# Patient Record
Sex: MALE | Race: OTHER | HISPANIC OR LATINO | Employment: FULL TIME | URBAN - METROPOLITAN AREA
[De-identification: names, ages, dates, MRNs, and addresses within clinical notes are randomized per-mention and may not be internally consistent; named-entity substitution may affect disease eponyms.]

---

## 2023-04-20 ENCOUNTER — APPOINTMENT (OUTPATIENT)
Dept: URGENT CARE | Facility: MEDICAL CENTER | Age: 28
End: 2023-04-20

## 2023-06-01 ENCOUNTER — HOSPITAL ENCOUNTER (EMERGENCY)
Facility: HOSPITAL | Age: 28
Discharge: HOME | End: 2023-06-01
Attending: EMERGENCY MEDICINE

## 2023-06-01 VITALS
DIASTOLIC BLOOD PRESSURE: 103 MMHG | HEART RATE: 73 BPM | TEMPERATURE: 97.8 F | OXYGEN SATURATION: 100 % | SYSTOLIC BLOOD PRESSURE: 171 MMHG | RESPIRATION RATE: 18 BRPM

## 2023-06-01 DIAGNOSIS — K08.89 PAIN, DENTAL: Primary | ICD-10-CM

## 2023-06-01 PROCEDURE — 96372 THER/PROPH/DIAG INJ SC/IM: CPT

## 2023-06-01 PROCEDURE — 99282 EMERGENCY DEPT VISIT SF MDM: CPT

## 2023-06-01 RX ORDER — OXYCODONE HYDROCHLORIDE 5 MG/1
5 TABLET ORAL ONCE
Status: COMPLETED | OUTPATIENT
Start: 2023-06-01 | End: 2023-06-01

## 2023-06-01 RX ORDER — KETOROLAC TROMETHAMINE 30 MG/ML
15 INJECTION, SOLUTION INTRAMUSCULAR; INTRAVENOUS ONCE
Status: COMPLETED | OUTPATIENT
Start: 2023-06-01 | End: 2023-06-01

## 2023-06-01 RX ORDER — PENICILLIN V POTASSIUM 500 MG/1
500 TABLET ORAL 4 TIMES DAILY
Qty: 28 TABLET | Refills: 0 | Status: SHIPPED | OUTPATIENT
Start: 2023-06-01 | End: 2023-06-08

## 2023-06-01 RX ORDER — PENICILLIN V POTASSIUM 250 MG/1
500 TABLET ORAL ONCE
Status: COMPLETED | OUTPATIENT
Start: 2023-06-01 | End: 2023-06-01

## 2023-06-01 RX ADMIN — BENZOCAINE 1 APPLICATION.: 220 GEL, DENTIFRICE DENTAL at 02:48

## 2023-06-01 RX ADMIN — OXYCODONE 5 MG: 5 TABLET ORAL at 02:45

## 2023-06-01 RX ADMIN — PENICILLIN V POTASSIUM 500 MG: 250 TABLET, FILM COATED ORAL at 02:45

## 2023-06-01 RX ADMIN — KETOROLAC TROMETHAMINE 15 MG: 30 INJECTION, SOLUTION INTRAMUSCULAR at 02:46

## 2023-06-01 NOTE — Clinical Note
Tahir Bledsoe was seen and treated in our emergency department on 6/1/2023  Diagnosis:     Chau Roche  may return to work on return date  He may return on this date: 06/05/2023         If you have any questions or concerns, please don't hesitate to call        Lesli Billings PA-C    ______________________________           _______________          _______________  Hospital Representative                              Date                                Time

## 2023-06-01 NOTE — DISCHARGE INSTRUCTIONS
Please return to the ED for any concerns as outlined in the AVS or for any other concerns  Please follow-up with your dentist at your scheduled appointment in 2 days for re-evaluation and further management  Continue ibuprofen and acetaminophen for pain control  Continue the full course of antibiotics as prescribed

## 2023-06-04 NOTE — ED PROVIDER NOTES
History  Chief Complaint   Patient presents with   • Dental Pain     Right lower dental pain, pt reports cracked tooth       Dental Pain  Location:  Lower (R)  Quality:  Constant, localized and throbbing  Severity:  Moderate  Duration:  2 days  Timing:  Constant  Progression:  Worsening  Context: dental fracture and poor dentition    Context: not abscess and not trauma    Relieved by:  Nothing  Worsened by:  Nothing  Ineffective treatments:  Acetaminophen  Associated symptoms: no congestion, no difficulty swallowing, no drooling, no facial pain, no facial swelling, no fever, no gum swelling, no headaches, no neck pain, no neck swelling, no oral bleeding, no oral lesions and no trismus        None       History reviewed  No pertinent past medical history  History reviewed  No pertinent surgical history  History reviewed  No pertinent family history  I have reviewed and agree with the history as documented  E-Cigarette/Vaping     E-Cigarette/Vaping Substances          Review of Systems   Constitutional: Negative for chills and fever  HENT: Positive for dental problem  Negative for congestion, drooling, facial swelling and mouth sores  Musculoskeletal: Negative for neck pain and neck stiffness  Neurological: Negative for headaches  All other systems reviewed and are negative  Physical Exam  Physical Exam  Vitals and nursing note reviewed  Constitutional:       General: He is not in acute distress  Appearance: He is well-developed  HENT:      Head: Normocephalic and atraumatic  Comments: No facial swelling or erythema  Mouth/Throat:      Mouth: Mucous membranes are moist       Comments: Likely significantly eroded tooth on lower R side of mouth with associated TTP  No associated gingival swelling, abscess, discharge or any other concerning findings  No TTP or induration below the tongue or in the submandibular area  Oropharynx otherwise patent and clear    Patient maintaining oral secretions and airway without issue  Eyes:      Conjunctiva/sclera: Conjunctivae normal    Cardiovascular:      Rate and Rhythm: Normal rate and regular rhythm  Heart sounds: No murmur heard  Pulmonary:      Effort: Pulmonary effort is normal  No respiratory distress  Breath sounds: Normal breath sounds  Abdominal:      Palpations: Abdomen is soft  Tenderness: There is no abdominal tenderness  Musculoskeletal:         General: No swelling  Cervical back: Neck supple  No rigidity or tenderness  Lymphadenopathy:      Cervical: No cervical adenopathy  Skin:     General: Skin is warm and dry  Capillary Refill: Capillary refill takes less than 2 seconds  Neurological:      Mental Status: He is alert and oriented to person, place, and time  GCS: GCS eye subscore is 4  GCS verbal subscore is 5  GCS motor subscore is 6  Psychiatric:         Mood and Affect: Mood normal          Vital Signs  ED Triage Vitals   Temperature Pulse Respirations Blood Pressure SpO2   06/01/23 0125 06/01/23 0125 06/01/23 0125 06/01/23 0126 06/01/23 0125   97 8 °F (36 6 °C) 73 18 (!) 171/103 100 %      Temp src Heart Rate Source Patient Position - Orthostatic VS BP Location FiO2 (%)   -- -- -- 06/01/23 0126 --      Right arm       Pain Score       06/01/23 0245       10 - Worst Possible Pain           Vitals:    06/01/23 0125 06/01/23 0126   BP:  (!) 171/103   Pulse: 73          Visual Acuity      ED Medications  Medications   ketorolac (TORADOL) injection 15 mg (15 mg Intramuscular Given 6/1/23 0246)   BENZOCAINE (DENTAL) 20 % swab 1 application  (1 application   Oral Given 6/1/23 0248)   oxyCODONE (ROXICODONE) IR tablet 5 mg (5 mg Oral Given 6/1/23 0245)   penicillin V potassium (VEETID) tablet 500 mg (500 mg Oral Given 6/1/23 0245)       Diagnostic Studies  Results Reviewed     None                 No orders to display              Procedures  Procedures         ED Course "                                          Medical Decision Making  Pt presenting to the ED with a complaint of dental pain  Has had an eroded tooth for a while which he states is fractured  Has been slightly painful over the last 2 weeks but worse over the last 2 days  Tried taking acetaminophen without any significant relief  Denies any other pain medications today  Has no other specific complaints  Reports he does have a dental appointment in 2 days for reevaluation and further management  On exam patient does have a significant eroded tooth on the lower right side of the mouth however no other associated findings  No signs of Philippe's  Oropharynx otherwise patent and clear  Patient overall very well-appearing  In light of complaints of dental pain with chance of dental infection will start patient on antibiotics  Treat symptomatically with Toradol, oxycodone and lollicaine  Pt will be getting ride home from friend at bedside  Will have patient follow-up with his scheduled dental appointment in 2 days  No other complaints or patient findings on PE to warrant further labs or imaging at this time  Symptomatic control and follow-up as outlined in the AVS   Strict return precautions verbally communicated to the patient as outlined in the AVS   All patient questions and concerns were answered  Patient verbally communicated their understanding and agreement to the above plan  Patient stable at discharge  Portions of the record may have been created with voice recognition software  Occasional wrong word or \"sound a like\" substitutions may have occurred due to the inherent limitations of voice recognition software  Read the chart carefully and recognize, using context, where substitutions have occurred  Pain, dental: acute illness or injury  Risk  OTC drugs  Prescription drug management            Disposition  Final diagnoses:   Pain, dental     Time reflects when diagnosis was documented in both " MDM as applicable and the Disposition within this note     Time User Action Codes Description Comment    6/1/2023  2:31 AM Ivette Paz Add [K08 89] Pain, dental       ED Disposition     ED Disposition   Discharge    Condition   Stable    Date/Time   Thu Jun 1, 2023  2:31 AM    Comment   Tahir Bledsoe discharge to home/self care  Follow-up Information     Follow up With Specialties Details Why Contact Info Additional 823 [facility] Emergency Department Emergency Medicine Go to  As needed, If symptoms worsen [facility] [id number]  [address] [place] [address] Emergency Department, [facility] [place], [place], [address]          Discharge Medication List as of 6/1/2023  2:36 AM      START taking these medications    Details   penicillin V potassium (VEETID) 500 mg tablet Take 1 tablet (500 mg total) by mouth 4 (four) times a day for 7 days, Starting Thu 6/1/2023, Until Thu 6/8/2023, Normal             No discharge procedures on file      PDMP Review     None          ED Provider  Electronically Signed by           Antony Pickard PA-C  06/03/23 9803

## 2023-08-04 ENCOUNTER — HOSPITAL ENCOUNTER (EMERGENCY)
Facility: HOSPITAL | Age: 28
Discharge: HOME | End: 2023-08-04
Attending: INTERNAL MEDICINE

## 2023-08-04 VITALS
OXYGEN SATURATION: 99 % | RESPIRATION RATE: 17 BRPM | SYSTOLIC BLOOD PRESSURE: 160 MMHG | HEART RATE: 95 BPM | TEMPERATURE: 97.8 F | DIASTOLIC BLOOD PRESSURE: 90 MMHG | WEIGHT: 242.51 LBS

## 2023-08-04 DIAGNOSIS — J02.9 ACUTE PHARYNGITIS: Primary | ICD-10-CM

## 2023-08-04 LAB
FLUAV RNA RESP QL NAA+PROBE: NEGATIVE
FLUBV RNA RESP QL NAA+PROBE: NEGATIVE
RSV RNA RESP QL NAA+PROBE: NEGATIVE
S PYO DNA THROAT QL NAA+PROBE: NOT DETECTED
SARS-COV-2 RNA RESP QL NAA+PROBE: NEGATIVE

## 2023-08-04 PROCEDURE — 99282 EMERGENCY DEPT VISIT SF MDM: CPT

## 2023-08-04 PROCEDURE — 99283 EMERGENCY DEPT VISIT LOW MDM: CPT | Performed by: INTERNAL MEDICINE

## 2023-08-04 PROCEDURE — 87651 STREP A DNA AMP PROBE: CPT | Performed by: INTERNAL MEDICINE

## 2023-08-04 PROCEDURE — 0241U HB NFCT DS VIR RESP RNA 4 TRGT: CPT | Performed by: INTERNAL MEDICINE

## 2023-08-04 RX ORDER — DEXAMETHASONE 4 MG/1
8 TABLET ORAL ONCE
Status: COMPLETED | OUTPATIENT
Start: 2023-08-04 | End: 2023-08-04

## 2023-08-04 RX ORDER — IBUPROFEN 600 MG/1
600 TABLET ORAL ONCE
Status: COMPLETED | OUTPATIENT
Start: 2023-08-04 | End: 2023-08-04

## 2023-08-04 RX ORDER — IBUPROFEN 400 MG/1
600 TABLET ORAL EVERY 6 HOURS PRN
Qty: 30 TABLET | Refills: 0 | Status: SHIPPED | OUTPATIENT
Start: 2023-08-04

## 2023-08-04 RX ADMIN — IBUPROFEN 600 MG: 600 TABLET, FILM COATED ORAL at 22:06

## 2023-08-04 RX ADMIN — DEXAMETHASONE 8 MG: 4 TABLET ORAL at 22:06

## 2023-08-04 NOTE — Clinical Note
Kate Shell was seen and treated in our emergency department on 8/4/2023. Diagnosis:     Jean  . He may return on this date: 08/07/2023         If you have any questions or concerns, please don't hesitate to call.       Med Mcgovern MD    ______________________________           _______________          _______________  [facility] Representative                              Date                                Time

## 2023-08-05 NOTE — ED PROVIDER NOTES
HPI: Patient is a 34 y.o. male who presents with 10 days of sore throat which the patient describes at moderate The patient has not had contact with people with similar symptoms. The patient has not taken any medication. No cough, fever, chill, nausea, vomiting, diarrhea. No Known Allergies    History reviewed. No pertinent past medical history. History reviewed. No pertinent surgical history. Nursing notes reviewed  Physical Exam:  ED Triage Vitals   Temperature Pulse Respirations Blood Pressure SpO2   08/04/23 2140 08/04/23 2140 08/04/23 2140 08/04/23 2140 08/04/23 2140   97.8 °F (36.6 °C) 95 17 160/90 99 %      Temp Source Heart Rate Source Patient Position - Orthostatic VS BP Location FiO2 (%)   08/04/23 2140 08/04/23 2140 08/04/23 2140 08/04/23 2140 --   Oral Monitor Sitting Right arm       Pain Score       08/04/23 2206       4           ROS: Positive for sore throat, the remainder of a 10 organ system ROS was otherwise unremarkable. General: awake, alert, no acute distress    Head: normocephalic, atraumatic    Eyes: no scleral icterus  Ears: external ears normal, hearing grossly intact  Nose: external exam grossly normal, positive nasal discharge  Neck: symmetric, No JVD noted, trachea midline. Uvula midline. Posterior oropharyngeal erythema, no tonsillar exudates. Anterior cervical lymphadenopathy   Pulmonary: no respiratory distress, no tachypnea noted, CTAB, no wheezes, no rales   Cardiovascular:RRR  Abdomen: no distention noted, no TTP, no guarding  Musculoskeletal: no deformities noted, tone normal  Neuro: grossly non-focal  Psych: mood and affect appropriate    The patient is stable and has a history and physical exam consistent with a viral illness. COVID19, influenza, strep testing has been performed. Would not empirically treat for strep pharyngitis based on Centor criteria. I considered the patient's other medical conditions as applicable/noted above in my medical decision making. The patient is stable upon discharge. The plan is for supportive care at home. The patient (and any family present) verbalized understanding of the discharge instructions and warnings that would necessitate return to the Emergency Department. All questions were answered prior to discharge. Medications   ibuprofen (MOTRIN) tablet 600 mg (600 mg Oral Given 8/4/23 2206)   dexamethasone (DECADRON) tablet 8 mg (8 mg Oral Given 8/4/23 2206)     Final diagnoses:   Acute pharyngitis     Time reflects when diagnosis was documented in both MDM as applicable and the Disposition within this note     Time User Action Codes Description Comment    8/4/2023 10:03 PM Sarah Mirella Add [J02.9] Acute pharyngitis       ED Disposition     ED Disposition   Discharge    Condition   Stable    Date/Time   Fri Aug 4, 2023 10:03 PM    [facility] discharge to home/self care.                Follow-up Information     Follow up With Specialties Details Why Contact Info Additional 299 [facility] Family Medicine Call  As needed 3300 [facility], [address] [place] [id number]  [address] [place] [address], [address] [place] [address], [address] [place] [address], [address] [facility] Emergency Department Emergency Medicine Go to  If symptoms worsen [address] [address]  1302 [facility] Emergency Department, [address] [place] [address], [address]        Patient's Medications   Discharge Prescriptions    IBUPROFEN (MOTRIN) 400 MG TABLET    Take 1.5 tablets (600 mg total) by mouth every 6 (six) hours as needed for mild pain       Start Date: 8/4/2023  End Date: --       Order Dose: 600 mg       Quantity: 30 tablet    Refills: 0    MENTHOL-CETYLPYRIDINIUM (CEPACOL) 3 MG LOZENGE    Take 1 lozenge (3 mg total) by mouth as needed for sore throat Start Date: 8/4/2023  End Date: --       Order Dose: 3 mg       Quantity: 18 lozenge    Refills: 0     No discharge procedures on file.     Electronically Signed by       Bridgette Baldwin MD  08/04/23 6021

## 2023-08-23 ENCOUNTER — APPOINTMENT (EMERGENCY)
Dept: RADIOLOGY | Facility: HOSPITAL | Age: 28
End: 2023-08-23
Payer: COMMERCIAL

## 2023-08-23 ENCOUNTER — HOSPITAL ENCOUNTER (EMERGENCY)
Facility: HOSPITAL | Age: 28
Discharge: HOME | End: 2023-08-23
Attending: EMERGENCY MEDICINE
Payer: COMMERCIAL

## 2023-08-23 VITALS
HEART RATE: 79 BPM | TEMPERATURE: 98.1 F | OXYGEN SATURATION: 100 % | WEIGHT: 244.27 LBS | DIASTOLIC BLOOD PRESSURE: 87 MMHG | RESPIRATION RATE: 20 BRPM | SYSTOLIC BLOOD PRESSURE: 161 MMHG

## 2023-08-23 DIAGNOSIS — J04.0 LARYNGITIS: Primary | ICD-10-CM

## 2023-08-23 DIAGNOSIS — S93.402A LEFT ANKLE SPRAIN: ICD-10-CM

## 2023-08-23 PROCEDURE — 99282 EMERGENCY DEPT VISIT SF MDM: CPT

## 2023-08-23 PROCEDURE — 96372 THER/PROPH/DIAG INJ SC/IM: CPT

## 2023-08-23 PROCEDURE — 99284 EMERGENCY DEPT VISIT MOD MDM: CPT | Performed by: PHYSICIAN ASSISTANT

## 2023-08-23 PROCEDURE — 73610 X-RAY EXAM OF ANKLE: CPT

## 2023-08-23 RX ORDER — KETOROLAC TROMETHAMINE 30 MG/ML
15 INJECTION, SOLUTION INTRAMUSCULAR; INTRAVENOUS ONCE
Status: COMPLETED | OUTPATIENT
Start: 2023-08-23 | End: 2023-08-23

## 2023-08-23 RX ADMIN — KETOROLAC TROMETHAMINE 15 MG: 30 INJECTION, SOLUTION INTRAMUSCULAR; INTRAVENOUS at 08:35

## 2023-08-23 NOTE — Clinical Note
Colette Santos was seen and treated in our emergency department on 8/23/2023. PLEASE HAVE SHERRI REST HIS VOICE, MINIMAL TALKING IF POSSIBLE    Diagnosis: Sara Carter  may return to work on return date. He may return on this date: 08/25/2023         If you have any questions or concerns, please don't hesitate to call.       Gonzalo Reid PA-C    ______________________________           _______________          _______________  Hospital Representative                              Date                                Time

## 2023-08-23 NOTE — ED PROVIDER NOTES
History  Chief Complaint   Patient presents with   • Sore Throat     Pt reports sore throat for the past three weeks with prior throat swab here and left ankle pain for past week. Pt plays baseball and is unsure of injury. 53-year-old male presents to emergency room for evaluation of left ankle pain. Onset 1 week ago. Patient states he does play recreational baseball and has not taken a rest from it. Unsure of clear injury. Pain is worse with walking however is able to do so. Admits to mild swelling. Denies self treatments. States when he was 17years old he did injure the ankle and have a cast, however no surgery. He also complains of a problem with his throat for the past 2 months. States it is mildly sore and he loses his voice everyday by the end of the day. He was seen here a few weeks ago and had a throat swab and covid swab done which were negative. He denies other URI sx's. History provided by:  Patient      Prior to Admission Medications   Prescriptions Last Dose Informant Patient Reported? Taking?   ibuprofen (MOTRIN) 400 mg tablet   No No   Sig: Take 1.5 tablets (600 mg total) by mouth every 6 (six) hours as needed for mild pain   menthol-cetylpyridinium (CEPACOL) 3 MG lozenge   No No   Sig: Take 1 lozenge (3 mg total) by mouth as needed for sore throat      Facility-Administered Medications: None       History reviewed. No pertinent past medical history. History reviewed. No pertinent surgical history. History reviewed. No pertinent family history. I have reviewed and agree with the history as documented.     E-Cigarette/Vaping   • E-Cigarette Use Never User      E-Cigarette/Vaping Substances   • Nicotine No    • THC No    • CBD No    • Flavoring No    • Other No    • Unknown No      Social History     Tobacco Use   • Smoking status: Never   • Smokeless tobacco: Never   Vaping Use   • Vaping Use: Never used   Substance Use Topics   • Alcohol use: Not Currently   • Drug use: Not Currently       Review of Systems   Constitutional: Negative for chills and fever. HENT: Positive for sore throat and voice change. Negative for congestion and ear pain. Eyes: Negative for redness. Respiratory: Negative for cough. Musculoskeletal: Positive for joint swelling. Skin: Negative for rash and wound. Neurological: Negative for weakness and numbness. Physical Exam  Physical Exam  Vitals and nursing note reviewed. Constitutional:       Appearance: Normal appearance. He is well-developed. HENT:      Head: Atraumatic. Right Ear: Tympanic membrane and external ear normal.      Left Ear: Tympanic membrane and external ear normal.      Nose: Nose normal. No rhinorrhea. Mouth/Throat:      Mouth: Mucous membranes are moist.      Pharynx: Uvula midline. No oropharyngeal exudate or posterior oropharyngeal erythema. Tonsils: No tonsillar exudate. Comments: Voice is hoarse  Eyes:      Conjunctiva/sclera: Conjunctivae normal.   Cardiovascular:      Rate and Rhythm: Normal rate and regular rhythm. Pulses: Normal pulses. Heart sounds: Normal heart sounds. Pulmonary:      Effort: Pulmonary effort is normal.      Breath sounds: Normal breath sounds. Musculoskeletal:      Cervical back: Neck supple. Right ankle: Normal.      Left ankle: Swelling (mild) present. No deformity or ecchymosis. Tenderness present over the medial malleolus. No base of 5th metatarsal or proximal fibula tenderness. Normal range of motion. Normal pulse. Left Achilles Tendon: Normal. Martin's test negative. Right foot: Normal.      Left foot: Normal.   Lymphadenopathy:      Cervical: No cervical adenopathy. Skin:     General: Skin is warm and dry. Capillary Refill: Capillary refill takes less than 2 seconds. Findings: No rash. Neurological:      Mental Status: He is alert.    Psychiatric:         Mood and Affect: Mood normal.         Vital Signs  ED Triage Vitals Temperature Pulse Respirations Blood Pressure SpO2   08/23/23 0819 08/23/23 0815 08/23/23 0815 08/23/23 0815 08/23/23 0815   98.1 °F (36.7 °C) 79 20 161/87 100 %      Temp Source Heart Rate Source Patient Position - Orthostatic VS BP Location FiO2 (%)   08/23/23 0819 08/23/23 0815 08/23/23 0815 08/23/23 0815 --   Oral Monitor Sitting Right arm       Pain Score       08/23/23 0815       5           Vitals:    08/23/23 0815   BP: 161/87   Pulse: 79   Patient Position - Orthostatic VS: Sitting         Visual Acuity      ED Medications  Medications   ketorolac (TORADOL) injection 15 mg (15 mg Intramuscular Given 8/23/23 0835)       Diagnostic Studies  Results Reviewed     None                 XR ankle 3+ views LEFT   Final Result by Sandee Mena MD (08/23 0912)      No acute osseous abnormality. Workstation performed: [id number]                    Procedures  Procedures   Ace wrap applied to left ankle by myself, NV intact s/p application. ED Course                               SBIRT 20yo+    Flowsheet Row Most Recent Value   Initial Alcohol Screen: US AUDIT-C     1. How often do you have a drink containing alcohol? 0 Filed at: 08/23/2023 0839   2. How many drinks containing alcohol do you have on a typical day you are drinking? 0 Filed at: 08/23/2023 0839   3a. Male UNDER 65: How often do you have five or more drinks on one occasion? 0 Filed at: 08/23/2023 0839   Audit-C Score 0 Filed at: 08/23/2023 6692   MARKUS: How many times in the past year have you. .. Used an illegal drug or used a prescription medication for non-medical reasons? Never Filed at: 08/23/2023 4833                    Medical Decision Making  Differential diagnosis includes but is not limited to: ankle fracture, sprain, tendonitis, laryngitis, vocal cord abnormality - refer to ENT for further evaluation.      Laryngitis: chronic illness or injury  Left ankle sprain: acute illness or injury  Amount and/or Complexity of Data Reviewed  External Data Reviewed: notes. Details: recent covid, strep swab both negative - no need to repeat  Radiology: ordered. Details: Results reviewed      Risk  Prescription drug management. Disposition  Final diagnoses:   Laryngitis   Left ankle sprain     Time reflects when diagnosis was documented in both MDM as applicable and the Disposition within this note     Time User Action Codes Description Comment    8/23/2023  9:35 AM Renee Lower L Add [J04.0] Laryngitis     8/23/2023  9:35 AM Aida Da Silva Add [A66.154O] Left ankle sprain       ED Disposition     ED Disposition   Discharge    Condition   Stable    Date/Time   Wed Aug 23, 2023  9:35 AM    Comment   [address] discharge to home/self care. Follow-up Information     Follow up With Specialties Details Why Contact Info Additional 401 [facility], DO Otolaryngology In 1 week  [address] [facility] [address]   100 [facility] Emergency Department Emergency Medicine  If symptoms worsen [address] [address]  1302 [facility] Emergency Department, [address] [place], [place], [address] [facility] [address] [address] [address] Orthopedic Surgery  As needed [address]  [facility] [address]  [phone] [address], [address] [place], [place], [id number] [phone]          Discharge Medication List as of 8/23/2023  9:37 AM      CONTINUE these medications which have NOT CHANGED    Details   ibuprofen (MOTRIN) 400 mg tablet Take 1.5 tablets (600 mg total) by mouth every 6 (six) hours as needed for mild pain, Starting Fri 8/4/2023, Normal      menthol-cetylpyridinium (CEPACOL) 3 MG lozenge Take 1 lozenge (3 mg total) by mouth as needed for sore throat, Starting Fri 8/4/2023, Normal PDMP Review     None          ED Provider  Electronically Signed by           Kenny Sorenson PA-C  08/23/23 3319

## 2023-09-12 ENCOUNTER — OFFICE VISIT (OUTPATIENT)
Dept: FAMILY MEDICINE CLINIC | Facility: CLINIC | Age: 28
End: 2023-09-12
Payer: COMMERCIAL

## 2023-09-12 VITALS
DIASTOLIC BLOOD PRESSURE: 70 MMHG | HEART RATE: 96 BPM | BODY MASS INDEX: 36.98 KG/M2 | RESPIRATION RATE: 16 BRPM | HEIGHT: 68 IN | OXYGEN SATURATION: 100 % | SYSTOLIC BLOOD PRESSURE: 120 MMHG | WEIGHT: 244 LBS | TEMPERATURE: 97.9 F

## 2023-09-12 DIAGNOSIS — E66.9 OBESITY (BMI 35.0-39.9 WITHOUT COMORBIDITY): ICD-10-CM

## 2023-09-12 DIAGNOSIS — R09.82 POST-NASAL DRIP: ICD-10-CM

## 2023-09-12 DIAGNOSIS — J38.2 VOCAL CORD NODULE: ICD-10-CM

## 2023-09-12 DIAGNOSIS — R49.0 VOICE HOARSENESS: ICD-10-CM

## 2023-09-12 DIAGNOSIS — Z76.89 ENCOUNTER TO ESTABLISH CARE: Primary | ICD-10-CM

## 2023-09-12 DIAGNOSIS — Z83.3 FAMILY HISTORY OF DIABETES MELLITUS: ICD-10-CM

## 2023-09-12 DIAGNOSIS — Z13.6 SCREENING FOR CARDIOVASCULAR CONDITION: ICD-10-CM

## 2023-09-12 DIAGNOSIS — Z11.59 NEED FOR HEPATITIS C SCREENING TEST: ICD-10-CM

## 2023-09-12 DIAGNOSIS — Z00.00 ANNUAL PHYSICAL EXAM: ICD-10-CM

## 2023-09-12 DIAGNOSIS — Z11.4 SCREENING FOR HIV (HUMAN IMMUNODEFICIENCY VIRUS): ICD-10-CM

## 2023-09-12 PROBLEM — M25.572 ACUTE LEFT ANKLE PAIN: Status: ACTIVE | Noted: 2023-09-12

## 2023-09-12 PROCEDURE — 99385 PREV VISIT NEW AGE 18-39: CPT | Performed by: PHYSICIAN ASSISTANT

## 2023-09-12 RX ORDER — FLUTICASONE PROPIONATE 50 MCG
1 SPRAY, SUSPENSION (ML) NASAL DAILY
Qty: 9.9 ML | Refills: 0 | Status: SHIPPED | OUTPATIENT
Start: 2023-09-12

## 2023-09-12 NOTE — ASSESSMENT & PLAN NOTE
X 2 months. Reviewed ED visits from 8/4 and 8/23 with negative strep and viral swabs. Reviewed ENT visit on 9/6/2023. Recommend vocal rest, no acid reflux or known dietary triggers, suspect due to PND, trial Flonase and follow-up ENT.

## 2023-09-12 NOTE — PROGRESS NOTES
[address] [place] [address] PRIMARY CARE [facility]    NAME: Tyler Marvin  AGE: 34 y.o. SEX: male  : 1995     DATE: 2023     Assessment and Plan:     Problem List Items Addressed This Visit        Respiratory    Vocal cord nodule     Small 1-2mm benign appearing nodule on anterior right true vocal cord per nasopharyngolaryngoscopy done on 2023 by ENT. Follow-up ENT. Suspected due to chronic throat clearing and vocal overuse. Recommend vocal rest.  Continue nasal spray per ENT. Other    BMI 37.0-37.9, adult     BMI Counseling: Body mass index is 37.1 kg/m². The BMI is above normal. Nutrition recommendations include reducing portion sizes, decreasing overall calorie intake, 3-5 servings of fruits/vegetables daily, reducing fast food intake, moderation in carbohydrate intake, increasing intake of lean protein, reducing intake of saturated fat and trans fat and reducing intake of cholesterol. Exercise recommendations include exercising 3-5 times per week and strength training exercises. Patient referred to nutritionist due to patient being obese. Relevant Orders    Ambulatory Referral to Nutrition Services    Voice hoarseness     X 2 months. Reviewed ED visits from  and  with negative strep and viral swabs. Reviewed ENT visit on 2023. Recommend vocal rest, no acid reflux or known dietary triggers, suspect due to PND, trial Flonase and follow-up ENT.           Relevant Medications    fluticasone (FLONASE) 50 mcg/act nasal spray   Other Visit Diagnoses     Encounter to establish care    -  Primary    moved from [place] about 3 years ago, presents with wife for routine wellness visit due to family hx of cancer and diabetes to establish care    Annual physical exam        Post-nasal drip        suspect cause of vocal hoarseness, worse in the mornings, failed ipratropium spray, made symptoms worse? will trial flonase and follow-up with ENT, no allergies    Relevant Medications    fluticasone (FLONASE) 50 mcg/act nasal spray    Family history of diabetes mellitus        grandmother who was his caretaker  from diabetes complications     Relevant Orders    HEMOGLOBIN A1C W/ EAG ESTIMATION    Obesity (BMI 35.0-39.9 without comorbidity)        Relevant Orders    HEMOGLOBIN A1C W/ EAG ESTIMATION    Ambulatory Referral to Nutrition Services    Screening for HIV (human immunodeficiency virus)        Relevant Orders    : HIV 1/2 AB/AG w Reflex [facility] for 2 yr old and above    Need for hepatitis C screening test        Relevant Orders    Hepatitis C antibody    Screening for cardiovascular condition        Relevant Orders    CBC and differential    Comprehensive metabolic panel    Lipid panel          Immunizations and preventive care screenings were discussed with patient today. Appropriate education was printed on patient's after visit summary. Counseling:  Alcohol/drug use: discussed moderation in alcohol intake, the recommendations for healthy alcohol use, and avoidance of illicit drug use. Dental Health: discussed importance of regular tooth brushing, flossing, and dental visits. Injury prevention: discussed safety/seat belts, safety helmets, smoke detectors, carbon dioxide detectors, and smoking near bedding or upholstery. Sexual health: discussed sexually transmitted diseases, partner selection, use of condoms, avoidance of unintended pregnancy, and contraceptive alternatives. Exercise: the importance of regular exercise/physical activity was discussed. Recommend exercise 3-5 times per week for at least 30 minutes. Return in about 1 year (around 2024).      Chief Complaint:     Chief Complaint   Patient presents with   • establish care   • Physical Exam   • Hoarse   • Blood Pressure Check      History of Present Illness:     Adult Annual Physical   Patient here for a comprehensive physical exam. The patient reports problems - vocal hoarseness. Working . Moved from [place] about 3 years ago. He has not had any routine screenings in several years. He presents for follow-up after recent ED visits for vocal hoarseness and sore throat. No inciting URI. Feels like is getting worse with intermittent pain in the mornings. He takes DayQuil as needed for pain which helps. Sometimes takes NyQuil. But no nasal congestion. Sometimes mucus in the back of throat. He tried nasal spray prescribed by ENT without improvement. Made it feel worse. Was to 10 pounds before getting . After gaining weight because of poor diet. Plan to lose weight. Interested in seeing a nutritionist.  No smoking, alcohol, drug use. Was raised by his grandmother who recently  from diabetes complications. His parents had him when he was young. No surgeries or medical history. Nora Jacobs, wife, present for exam.  Diet and Physical Activity  Diet/Nutrition: poor diet and frequent junk food. Exercise: moderate cardiovascular exercise and 1-2 times a week on average. breakfast - eggs, fried/boils, bread   Lunch - rice, chicken, beans  Snack - cookies, junk food  Dinner - rice, chicken, beans  No veggies, no fruits     Depression Screening  PHQ-2/9 Depression Screening    Little interest or pleasure in doing things: 0 - not at all  Feeling down, depressed, or hopeless: 0 - not at all  PHQ-2 Score: 0  PHQ-2 Interpretation: Negative depression screen       General Health  Sleep: sleeps well and gets 7-8 hours of sleep on average. Hearing: normal - bilateral.  Vision: no vision problems. Dental: regular dental visits and brushes teeth twice daily.  Health  History of STDs?: no.     Review of Systems:     Review of Systems   Constitutional: Negative for chills and fever. HENT: Positive for postnasal drip, sore throat and voice change. Negative for ear pain. Eyes: Negative for pain and visual disturbance. Respiratory: Negative for cough and shortness of breath. Cardiovascular: Negative for chest pain and palpitations. Gastrointestinal: Negative for abdominal pain, constipation, diarrhea and vomiting. Genitourinary: Negative for dysuria and hematuria. Musculoskeletal: Negative for arthralgias and back pain. Skin: Negative for color change and rash. Neurological: Negative for seizures and syncope. All other systems reviewed and are negative. Past Medical History:     Past Medical History:   Diagnosis Date   • Obesity       Past Surgical History:     History reviewed. No pertinent surgical history.    Social History:     Social History     Socioeconomic History   • Marital status: Single     Spouse name: None   • Number of children: None   • Years of education: None   • Highest education level: None   Occupational History   • None   Tobacco Use   • Smoking status: Never   • Smokeless tobacco: Never   Vaping Use   • Vaping Use: Never used   Substance and Sexual Activity   • Alcohol use: Not Currently   • Drug use: Not Currently   • Sexual activity: Yes     Partners: Female   Other Topics Concern   • None   Social History Narrative   • None     Social Determinants of Health     Financial Resource Strain: Not on file   Food Insecurity: Not on file   Transportation Needs: Not on file   Physical Activity: Not on file   Stress: Not on file   Social Connections: Not on file   Intimate Partner Violence: Not on file   Housing Stability: Not on file      Family History:     Family History   Problem Relation Age of Onset   • No Known Problems Mother    • No Known Problems Father    • Diabetes Paternal Grandmother    • Brain cancer Maternal Uncle       Current Medications:     Current Outpatient Medications   Medication Sig Dispense Refill   • fluticasone (FLONASE) 50 mcg/act nasal spray 1 spray into each nostril daily 9.9 mL 0   • ibuprofen (MOTRIN) 400 mg tablet Take 1.5 tablets (600 mg total) by mouth every 6 (six) hours as needed for mild pain 30 tablet 0     No current facility-administered medications for this visit. Allergies:     No Known Allergies   Physical Exam:     /70 (BP Location: Left arm, Patient Position: Sitting, Cuff Size: Standard)   Pulse 96   Temp 97.9 °F (36.6 °C) (Tympanic)   Resp 16   Ht 5' 8" (1.727 m)   Wt 111 kg (244 lb)   SpO2 100%   BMI 37.10 kg/m²     Physical Exam  Vitals and nursing note reviewed. Constitutional:       General: He is not in acute distress. Appearance: He is well-developed. He is obese. HENT:      Head: Normocephalic and atraumatic. Right Ear: Tympanic membrane, ear canal and external ear normal.      Left Ear: Tympanic membrane, ear canal and external ear normal.      Mouth/Throat:      Mouth: Mucous membranes are moist.   Eyes:      Extraocular Movements: Extraocular movements intact. Conjunctiva/sclera: Conjunctivae normal.      Pupils: Pupils are equal, round, and reactive to light. Cardiovascular:      Rate and Rhythm: Normal rate and regular rhythm. Heart sounds: No murmur heard. Pulmonary:      Effort: Pulmonary effort is normal. No respiratory distress. Breath sounds: Normal breath sounds. Abdominal:      Palpations: Abdomen is soft. Tenderness: There is no abdominal tenderness. Musculoskeletal:         General: No swelling. Cervical back: Neck supple. Skin:     General: Skin is warm and dry. Capillary Refill: Capillary refill takes less than 2 seconds. Neurological:      Mental Status: He is alert.    Psychiatric:         Mood and Affect: Mood normal.          Sis Olivera PA-C   [address]

## 2023-09-12 NOTE — ASSESSMENT & PLAN NOTE
Small 1-2mm benign appearing nodule on anterior right true vocal cord per nasopharyngolaryngoscopy done on 9/6/2023 by ENT. Follow-up ENT. Suspected due to chronic throat clearing and vocal overuse. Recommend vocal rest.  Continue nasal spray per ENT.

## 2023-09-12 NOTE — ASSESSMENT & PLAN NOTE
BMI Counseling: Body mass index is 37.1 kg/m². The BMI is above normal. Nutrition recommendations include reducing portion sizes, decreasing overall calorie intake, 3-5 servings of fruits/vegetables daily, reducing fast food intake, moderation in carbohydrate intake, increasing intake of lean protein, reducing intake of saturated fat and trans fat and reducing intake of cholesterol. Exercise recommendations include exercising 3-5 times per week and strength training exercises. Patient referred to nutritionist due to patient being obese.

## 2023-10-31 ENCOUNTER — OFFICE VISIT (OUTPATIENT)
Dept: FAMILY MEDICINE CLINIC | Facility: CLINIC | Age: 28
End: 2023-10-31
Payer: COMMERCIAL

## 2023-10-31 VITALS
HEART RATE: 79 BPM | DIASTOLIC BLOOD PRESSURE: 80 MMHG | SYSTOLIC BLOOD PRESSURE: 122 MMHG | RESPIRATION RATE: 17 BRPM | BODY MASS INDEX: 37.04 KG/M2 | HEIGHT: 68 IN | WEIGHT: 244.4 LBS | TEMPERATURE: 98.4 F | OXYGEN SATURATION: 100 %

## 2023-10-31 DIAGNOSIS — Z20.2 POTENTIAL EXPOSURE TO STD: ICD-10-CM

## 2023-10-31 DIAGNOSIS — R03.0 ELEVATED BP WITHOUT DIAGNOSIS OF HYPERTENSION: ICD-10-CM

## 2023-10-31 DIAGNOSIS — J38.2 VOCAL CORD NODULE: ICD-10-CM

## 2023-10-31 DIAGNOSIS — M54.50 ACUTE MIDLINE LOW BACK PAIN WITHOUT SCIATICA: Primary | ICD-10-CM

## 2023-10-31 PROBLEM — R49.0 VOICE HOARSENESS: Status: RESOLVED | Noted: 2023-09-12 | Resolved: 2023-10-31

## 2023-10-31 PROBLEM — M25.572 ACUTE LEFT ANKLE PAIN: Status: RESOLVED | Noted: 2023-09-12 | Resolved: 2023-10-31

## 2023-10-31 PROCEDURE — 99214 OFFICE O/P EST MOD 30 MIN: CPT | Performed by: PHYSICIAN ASSISTANT

## 2023-10-31 NOTE — PROGRESS NOTES
Name: Colette Santos      : 1995      MRN: [id number]  Encounter Provider: Paula Galan PA-C  Encounter Date: 10/31/2023   Encounter department: [address] [place] [address]     1. Acute midline low back pain without sciatica  Assessment & Plan:  Suspect muscular, recommend stretching/exercising and core strengthening, previously professional . Recommend ibuprofen as needed for back pain and follow-up if no improvement with home exercise t/c PT      2. Potential exposure to STD  Comments:  wife tested positive chlamydia on 10/4/23 in ER due to pelvic pain, has not been sexually active since, discussed importance test of cure prior to reengaging  Orders:  -     [facility]-Syphilis Screening (Total Syphilis IGG/IGM); Future  -     Chlamydia/GC amplified DNA by PCR; Future    3. Vocal cord nodule  Assessment & Plan:  Reviewed ENT consult note from 10/9/23. Resolved hoarseness. Benign appearing per ENT, follow-up for surveillance. 4. Elevated BP without diagnosis of hypertension  Comments:  improved with recheck, recommend weight loss and DASH diet, exercise           Subjective      Justino Morales is a 34 y.o. male who presents with concerns for STD testing and low back pain. He took his wife to ER about 1 month ago with pelvic pain and she tested positive for chlamydia. 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 Came back a week ago and made appointment to check. No symptoms has not been sexually active at all since positive result on 10/4/23. 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 Tightness in low back. No weakness, saddle anesthesia, bladder or bowel changes. History was conducted in [place] without the use of . Wife, Henna Durham, present for exam and helped to provide a portion of history in [facility] and [place].          Review of Systems   Constitutional:  Negative for fever and unexpected weight change. Respiratory:  Negative for shortness of breath. Cardiovascular:  Negative for chest pain. Genitourinary:  Negative for dysuria, penile discharge, penile pain, penile swelling, scrotal swelling, testicular pain and urgency. Musculoskeletal:  Positive for back pain. Current Outpatient Medications on File Prior to Visit   Medication Sig   • [DISCONTINUED] ibuprofen (MOTRIN) 400 mg tablet Take 1.5 tablets (600 mg total) by mouth every 6 (six) hours as needed for mild pain   • [DISCONTINUED] fluticasone (FLONASE) 50 mcg/act nasal spray 1 spray into each nostril daily (Patient not taking: Reported on 10/31/2023)       Objective     /80 (BP Location: Right arm, Patient Position: Sitting, Cuff Size: Large)   Pulse 79   Temp 98.4 °F (36.9 °C) (Tympanic)   Resp 17   Ht 5' 8" (1.727 m)   Wt 111 kg (244 lb 6.4 oz)   SpO2 100%   BMI 37.16 kg/m²     Physical Exam  Vitals and nursing note reviewed. Constitutional:       Appearance: Normal appearance. Cardiovascular:      Rate and Rhythm: Normal rate and regular rhythm. Pulses: Normal pulses. Heart sounds: Normal heart sounds. Pulmonary:      Effort: Pulmonary effort is normal.      Breath sounds: Normal breath sounds. Musculoskeletal:         General: Tenderness present. Cervical back: Normal.      Thoracic back: Normal.      Lumbar back: Tenderness present. Decreased range of motion. Negative right straight leg raise test and negative left straight leg raise test.        Back:       Comments: Normal heel and toe walk  5/5 strength in LE b/l  Poor flexibility    Neurological:      Mental Status: He is alert and oriented to person, place, and time. Mental status is at baseline. Sensory: Sensation is intact. Motor: Motor function is intact. Gait: Gait is intact.  Tandem walk normal.       Ellis Khan PA-C

## 2023-10-31 NOTE — ASSESSMENT & PLAN NOTE
Reviewed ENT consult note from 10/9/23. Resolved hoarseness. Benign appearing per ENT, follow-up for surveillance.

## 2023-10-31 NOTE — ASSESSMENT & PLAN NOTE
Suspect muscular, recommend stretching/exercising and core strengthening, previously professional .  Recommend ibuprofen as needed for back pain and follow-up if no improvement with home exercise t/c PT

## 2023-11-04 ENCOUNTER — APPOINTMENT (OUTPATIENT)
Dept: LAB | Facility: HOSPITAL | Age: 28
End: 2023-11-04
Payer: COMMERCIAL

## 2023-11-04 DIAGNOSIS — Z13.6 SCREENING FOR CARDIOVASCULAR CONDITION: ICD-10-CM

## 2023-11-04 DIAGNOSIS — Z11.4 SCREENING FOR HIV (HUMAN IMMUNODEFICIENCY VIRUS): ICD-10-CM

## 2023-11-04 DIAGNOSIS — Z11.59 NEED FOR HEPATITIS C SCREENING TEST: ICD-10-CM

## 2023-11-04 DIAGNOSIS — Z20.2 POTENTIAL EXPOSURE TO STD: ICD-10-CM

## 2023-11-04 DIAGNOSIS — E66.9 OBESITY (BMI 35.0-39.9 WITHOUT COMORBIDITY): ICD-10-CM

## 2023-11-04 DIAGNOSIS — Z83.3 FAMILY HISTORY OF DIABETES MELLITUS: ICD-10-CM

## 2023-11-04 LAB
ALBUMIN SERPL BCP-MCNC: 4.9 G/DL (ref 3.5–5)
ALP SERPL-CCNC: 71 U/L (ref 34–104)
ALT SERPL W P-5'-P-CCNC: 25 U/L (ref 7–52)
ANION GAP SERPL CALCULATED.3IONS-SCNC: 9 MMOL/L
AST SERPL W P-5'-P-CCNC: 21 U/L (ref 13–39)
BASOPHILS # BLD AUTO: 0.04 THOUSANDS/ÂΜL (ref 0–0.1)
BASOPHILS NFR BLD AUTO: 1 % (ref 0–1)
BILIRUB SERPL-MCNC: 1.57 MG/DL (ref 0.2–1)
BUN SERPL-MCNC: 19 MG/DL (ref 5–25)
CALCIUM SERPL-MCNC: 10.4 MG/DL (ref 8.4–10.2)
CHLORIDE SERPL-SCNC: 102 MMOL/L (ref 96–108)
CHOLEST SERPL-MCNC: 159 MG/DL
CO2 SERPL-SCNC: 30 MMOL/L (ref 21–32)
CREAT SERPL-MCNC: 1.08 MG/DL (ref 0.6–1.3)
EOSINOPHIL # BLD AUTO: 0.06 THOUSAND/ÂΜL (ref 0–0.61)
EOSINOPHIL NFR BLD AUTO: 1 % (ref 0–6)
ERYTHROCYTE [DISTWIDTH] IN BLOOD BY AUTOMATED COUNT: 12.5 % (ref 11.6–15.1)
GFR SERPL CREATININE-BSD FRML MDRD: 94 ML/MIN/1.73SQ M
GLUCOSE P FAST SERPL-MCNC: 100 MG/DL (ref 65–99)
HCT VFR BLD AUTO: 47.9 % (ref 36.5–49.3)
HDLC SERPL-MCNC: 46 MG/DL
HGB BLD-MCNC: 15.4 G/DL (ref 12–17)
IMM GRANULOCYTES # BLD AUTO: 0.07 THOUSAND/UL (ref 0–0.2)
IMM GRANULOCYTES NFR BLD AUTO: 1 % (ref 0–2)
LDLC SERPL CALC-MCNC: 80 MG/DL (ref 0–100)
LYMPHOCYTES # BLD AUTO: 2.22 THOUSANDS/ÂΜL (ref 0.6–4.47)
LYMPHOCYTES NFR BLD AUTO: 33 % (ref 14–44)
MCH RBC QN AUTO: 28.2 PG (ref 26.8–34.3)
MCHC RBC AUTO-ENTMCNC: 32.2 G/DL (ref 31.4–37.4)
MCV RBC AUTO: 88 FL (ref 82–98)
MONOCYTES # BLD AUTO: 0.53 THOUSAND/ÂΜL (ref 0.17–1.22)
MONOCYTES NFR BLD AUTO: 8 % (ref 4–12)
NEUTROPHILS # BLD AUTO: 3.8 THOUSANDS/ÂΜL (ref 1.85–7.62)
NEUTS SEG NFR BLD AUTO: 56 % (ref 43–75)
NONHDLC SERPL-MCNC: 113 MG/DL
NRBC BLD AUTO-RTO: 0 /100 WBCS
PLATELET # BLD AUTO: 276 THOUSANDS/UL (ref 149–390)
PMV BLD AUTO: 10.3 FL (ref 8.9–12.7)
POTASSIUM SERPL-SCNC: 4.1 MMOL/L (ref 3.5–5.3)
PROT SERPL-MCNC: 8 G/DL (ref 6.4–8.4)
RBC # BLD AUTO: 5.47 MILLION/UL (ref 3.88–5.62)
SODIUM SERPL-SCNC: 141 MMOL/L (ref 135–147)
TRIGL SERPL-MCNC: 163 MG/DL
WBC # BLD AUTO: 6.72 THOUSAND/UL (ref 4.31–10.16)

## 2023-11-04 PROCEDURE — 86803 HEPATITIS C AB TEST: CPT

## 2023-11-04 PROCEDURE — 86780 TREPONEMA PALLIDUM: CPT

## 2023-11-04 PROCEDURE — 85025 COMPLETE CBC W/AUTO DIFF WBC: CPT

## 2023-11-04 PROCEDURE — 87491 CHLMYD TRACH DNA AMP PROBE: CPT

## 2023-11-04 PROCEDURE — 83036 HEMOGLOBIN GLYCOSYLATED A1C: CPT

## 2023-11-04 PROCEDURE — 87591 N.GONORRHOEAE DNA AMP PROB: CPT

## 2023-11-04 PROCEDURE — 80061 LIPID PANEL: CPT

## 2023-11-04 PROCEDURE — 80053 COMPREHEN METABOLIC PANEL: CPT

## 2023-11-04 PROCEDURE — 36415 COLL VENOUS BLD VENIPUNCTURE: CPT

## 2023-11-04 PROCEDURE — 87389 HIV-1 AG W/HIV-1&-2 AB AG IA: CPT

## 2023-11-05 LAB
EST. AVERAGE GLUCOSE BLD GHB EST-MCNC: 105 MG/DL
HBA1C MFR BLD: 5.3 %
TREPONEMA PALLIDUM IGG+IGM AB [PRESENCE] IN SERUM OR PLASMA BY IMMUNOASSAY: NORMAL

## 2023-11-06 PROBLEM — R17 TOTAL BILIRUBIN, ELEVATED: Status: ACTIVE | Noted: 2023-11-06

## 2023-11-06 LAB
C TRACH DNA SPEC QL NAA+PROBE: NEGATIVE
HCV AB SER QL: NORMAL
HIV 1+2 AB+HIV1 P24 AG SERPL QL IA: NORMAL
HIV 2 AB SERPL QL IA: NORMAL
HIV1 AB SERPL QL IA: NORMAL
HIV1 P24 AG SERPL QL IA: NORMAL
N GONORRHOEA DNA SPEC QL NAA+PROBE: NEGATIVE

## 2024-01-07 ENCOUNTER — HOSPITAL ENCOUNTER (EMERGENCY)
Facility: HOSPITAL | Age: 29
Discharge: HOME | End: 2024-01-07
Attending: EMERGENCY MEDICINE
Payer: COMMERCIAL

## 2024-01-07 ENCOUNTER — APPOINTMENT (EMERGENCY)
Dept: CT IMAGING | Facility: HOSPITAL | Age: 29
End: 2024-01-07
Payer: COMMERCIAL

## 2024-01-07 VITALS
BODY MASS INDEX: 37.54 KG/M2 | TEMPERATURE: 97.6 F | HEART RATE: 75 BPM | DIASTOLIC BLOOD PRESSURE: 65 MMHG | OXYGEN SATURATION: 100 % | RESPIRATION RATE: 16 BRPM | SYSTOLIC BLOOD PRESSURE: 141 MMHG | WEIGHT: 246.91 LBS

## 2024-01-07 DIAGNOSIS — K11.21 ACUTE PAROTITIS: Primary | ICD-10-CM

## 2024-01-07 LAB
ANION GAP SERPL CALCULATED.3IONS-SCNC: 4 MMOL/L
BASOPHILS # BLD AUTO: 0.03 THOUSANDS/ÂΜL (ref 0–0.1)
BASOPHILS NFR BLD AUTO: 1 % (ref 0–1)
BUN SERPL-MCNC: 14 MG/DL (ref 5–25)
CALCIUM SERPL-MCNC: 10 MG/DL (ref 8.4–10.2)
CHLORIDE SERPL-SCNC: 101 MMOL/L (ref 96–108)
CO2 SERPL-SCNC: 31 MMOL/L (ref 21–32)
CREAT SERPL-MCNC: 1 MG/DL (ref 0.6–1.3)
EOSINOPHIL # BLD AUTO: 0.06 THOUSAND/ÂΜL (ref 0–0.61)
EOSINOPHIL NFR BLD AUTO: 1 % (ref 0–6)
ERYTHROCYTE [DISTWIDTH] IN BLOOD BY AUTOMATED COUNT: 12.4 % (ref 11.6–15.1)
GFR SERPL CREATININE-BSD FRML MDRD: 103 ML/MIN/1.73SQ M
GLUCOSE SERPL-MCNC: 93 MG/DL (ref 65–140)
HCT VFR BLD AUTO: 44.7 % (ref 36.5–49.3)
HGB BLD-MCNC: 15.1 G/DL (ref 12–17)
IMM GRANULOCYTES # BLD AUTO: 0.01 THOUSAND/UL (ref 0–0.2)
IMM GRANULOCYTES NFR BLD AUTO: 0 % (ref 0–2)
LYMPHOCYTES # BLD AUTO: 1.82 THOUSANDS/ÂΜL (ref 0.6–4.47)
LYMPHOCYTES NFR BLD AUTO: 30 % (ref 14–44)
MCH RBC QN AUTO: 28.5 PG (ref 26.8–34.3)
MCHC RBC AUTO-ENTMCNC: 33.8 G/DL (ref 31.4–37.4)
MCV RBC AUTO: 85 FL (ref 82–98)
MONOCYTES # BLD AUTO: 0.49 THOUSAND/ÂΜL (ref 0.17–1.22)
MONOCYTES NFR BLD AUTO: 8 % (ref 4–12)
NEUTROPHILS # BLD AUTO: 3.74 THOUSANDS/ÂΜL (ref 1.85–7.62)
NEUTS SEG NFR BLD AUTO: 60 % (ref 43–75)
NRBC BLD AUTO-RTO: 0 /100 WBCS
PLATELET # BLD AUTO: 244 THOUSANDS/UL (ref 149–390)
PMV BLD AUTO: 10 FL (ref 8.9–12.7)
POTASSIUM SERPL-SCNC: 3.6 MMOL/L (ref 3.5–5.3)
RBC # BLD AUTO: 5.29 MILLION/UL (ref 3.88–5.62)
SODIUM SERPL-SCNC: 136 MMOL/L (ref 135–147)
WBC # BLD AUTO: 6.15 THOUSAND/UL (ref 4.31–10.16)

## 2024-01-07 PROCEDURE — 70487 CT MAXILLOFACIAL W/DYE: CPT

## 2024-01-07 PROCEDURE — 99284 EMERGENCY DEPT VISIT MOD MDM: CPT | Performed by: PHYSICIAN ASSISTANT

## 2024-01-07 PROCEDURE — 85025 COMPLETE CBC W/AUTO DIFF WBC: CPT | Performed by: PHYSICIAN ASSISTANT

## 2024-01-07 PROCEDURE — 80048 BASIC METABOLIC PNL TOTAL CA: CPT | Performed by: PHYSICIAN ASSISTANT

## 2024-01-07 PROCEDURE — G1004 CDSM NDSC: HCPCS

## 2024-01-07 PROCEDURE — 36415 COLL VENOUS BLD VENIPUNCTURE: CPT | Performed by: PHYSICIAN ASSISTANT

## 2024-01-07 PROCEDURE — 96374 THER/PROPH/DIAG INJ IV PUSH: CPT

## 2024-01-07 PROCEDURE — 99283 EMERGENCY DEPT VISIT LOW MDM: CPT

## 2024-01-07 RX ORDER — KETOROLAC TROMETHAMINE 30 MG/ML
15 INJECTION, SOLUTION INTRAMUSCULAR; INTRAVENOUS ONCE
Status: COMPLETED | OUTPATIENT
Start: 2024-01-07 | End: 2024-01-07

## 2024-01-07 RX ORDER — AMOXICILLIN AND CLAVULANATE POTASSIUM 875; 125 MG/1; MG/1
1 TABLET, FILM COATED ORAL EVERY 12 HOURS
Qty: 20 TABLET | Refills: 0 | Status: SHIPPED | OUTPATIENT
Start: 2024-01-07 | End: 2024-01-17

## 2024-01-07 RX ADMIN — KETOROLAC TROMETHAMINE 15 MG: 30 INJECTION, SOLUTION INTRAMUSCULAR; INTRAVENOUS at 10:19

## 2024-01-07 RX ADMIN — IOHEXOL 85 ML: 350 INJECTION, SOLUTION INTRAVENOUS at 10:52

## 2024-01-07 NOTE — Clinical Note
Jean Godfrey was seen and treated in our emergency department on 1/7/2024.                Diagnosis:     Jean  may return to work on return date.    He may return on this date: 01/09/2024         If you have any questions or concerns, please don't hesitate to call.      Radha Tmolinson PA-C    ______________________________           _______________          _______________  Hospital Representative                              Date                                Time

## 2024-01-07 NOTE — ED PROVIDER NOTES
History  Chief Complaint   Patient presents with    Facial Swelling     Pt reports left side facial edema that he awoke with today.  +left neck pain and left ear pain     Patient is a 28-year-old male with no reported past medical history, presenting to the ED for evaluation of left-sided facial swelling x 1 day.  Patient states that he had mild pain and swelling of the left side of the face throughout the day yesterday.  He woke up this morning with significantly worsening swelling over the left side of the face.  He states that the pain is radiating into the left ear and into the left neck/jaw.  He endorses pain with opening and closing the jaw but does not have any trismus.  He denies any fevers, chills, cough, congestion, hearing loss, otorrhea, sore throat, dysphagia, odynophagia or shortness of breath. He denies any dental pain or dental abscess.         None       Past Medical History:   Diagnosis Date    Obesity        History reviewed. No pertinent surgical history.    Family History   Problem Relation Age of Onset    No Known Problems Mother     No Known Problems Father     Diabetes Paternal Grandmother     Brain cancer Maternal Uncle      I have reviewed and agree with the history as documented.    E-Cigarette/Vaping    E-Cigarette Use Never User      E-Cigarette/Vaping Substances    Nicotine No     THC No     CBD No     Flavoring No     Other No     Unknown No      Social History     Tobacco Use    Smoking status: Never    Smokeless tobacco: Never   Vaping Use    Vaping status: Never Used   Substance Use Topics    Alcohol use: Not Currently    Drug use: Not Currently       Review of Systems   Constitutional:  Negative for chills and fever.   HENT:  Positive for ear pain and facial swelling. Negative for congestion, mouth sores, rhinorrhea, sore throat, trouble swallowing and voice change.    Eyes:  Negative for visual disturbance.   Respiratory:  Negative for cough and shortness of breath.     Cardiovascular:  Negative for chest pain and palpitations.   Gastrointestinal:  Negative for abdominal pain, constipation, diarrhea, nausea and vomiting.   Genitourinary:  Negative for flank pain and hematuria.   Musculoskeletal:  Positive for neck pain. Negative for back pain, myalgias and neck stiffness.   Skin:  Negative for color change and rash.   Neurological:  Negative for dizziness, speech difficulty, numbness and headaches.   All other systems reviewed and are negative.      Physical Exam  Physical Exam  Vitals and nursing note reviewed.   Constitutional:       General: He is awake. He is not in acute distress.     Appearance: Normal appearance. He is well-developed. He is not ill-appearing or diaphoretic.   HENT:      Head: Normocephalic and atraumatic.      Jaw: No trismus.      Comments: Swelling and tenderness over the left side of the face/jaw, primarily in the region of the parotid gland and left jawline.  No overlying erythema/warmth.  Patient reports pain with opening the jaw but has no trismus.     Right Ear: External ear normal.      Left Ear: External ear normal.      Ears:      Comments: Normal tympanic membranes and canals bilaterally.  No evidence of otitis media or otitis externa.  No mastoid tenderness or mastoid erythema.  No otorrhea.     Nose: Nose normal.      Mouth/Throat:      Lips: Pink.      Mouth: Mucous membranes are moist.      Tonsils: No tonsillar exudate or tonsillar abscesses.      Comments: No pharyngeal erythema or tonsillar exudate.  Uvula midline.  Patient is tolerating secretions without difficulty.  No dental tenderness or evidence of dental abscess.  The floor of the mouth is soft, no brawny edema under the tongue.  No submandibular swelling.  Eyes:      General: Lids are normal. No scleral icterus.     Conjunctiva/sclera: Conjunctivae normal.      Pupils: Pupils are equal, round, and reactive to light.   Neck:      Comments: Patient is able to move neck freely in all  directions, no nuchal rigidity.  Cardiovascular:      Rate and Rhythm: Normal rate and regular rhythm.      Pulses: Normal pulses.           Radial pulses are 2+ on the right side and 2+ on the left side.      Heart sounds: Normal heart sounds, S1 normal and S2 normal.   Pulmonary:      Effort: Pulmonary effort is normal. No accessory muscle usage.      Breath sounds: Normal breath sounds. No stridor. No decreased breath sounds, wheezing, rhonchi or rales.   Abdominal:      General: Abdomen is flat. Bowel sounds are normal. There is no distension.      Palpations: Abdomen is soft.      Tenderness: There is no abdominal tenderness. There is no right CVA tenderness, left CVA tenderness, guarding or rebound.   Musculoskeletal:      Cervical back: Full passive range of motion without pain, normal range of motion and neck supple. No signs of trauma. No pain with movement. Normal range of motion.      Right lower leg: No edema.      Left lower leg: No edema.   Lymphadenopathy:      Cervical: No cervical adenopathy.   Skin:     General: Skin is warm and dry.      Capillary Refill: Capillary refill takes less than 2 seconds.      Coloration: Skin is not cyanotic, jaundiced or pale.   Neurological:      Mental Status: He is alert and oriented to person, place, and time.      GCS: GCS eye subscore is 4. GCS verbal subscore is 5. GCS motor subscore is 6.      Cranial Nerves: No dysarthria or facial asymmetry.      Gait: Gait normal.   Psychiatric:         Attention and Perception: Attention normal.         Mood and Affect: Mood normal.         Speech: Speech normal.         Behavior: Behavior normal. Behavior is cooperative.         Vital Signs  ED Triage Vitals [01/07/24 0937]   Temperature Pulse Respirations Blood Pressure SpO2   97.6 °F (36.4 °C) 75 18 117/76 98 %      Temp Source Heart Rate Source Patient Position - Orthostatic VS BP Location FiO2 (%)   Oral Monitor Sitting Right arm --      Pain Score       7            Vitals:    01/07/24 0937 01/07/24 1238   BP: 117/76 141/65   Pulse: 75    Patient Position - Orthostatic VS: Sitting          Visual Acuity      ED Medications  Medications   ketorolac (TORADOL) injection 15 mg (15 mg Intravenous Given 1/7/24 1019)   iohexol (OMNIPAQUE) 350 MG/ML injection (MULTI-DOSE) 85 mL (85 mL Intravenous Given 1/7/24 1052)       Diagnostic Studies  Results Reviewed       Procedure Component Value Units Date/Time    Basic metabolic panel [[id number]] Collected: 01/07/24 1018    Lab Status: Final result Specimen: Blood from Arm, Left Updated: 01/07/24 1039     Sodium 136 mmol/L      Potassium 3.6 mmol/L      Chloride 101 mmol/L      CO2 31 mmol/L      ANION GAP 4 mmol/L      BUN 14 mg/dL      Creatinine 1.00 mg/dL      Glucose 93 mg/dL      Calcium 10.0 mg/dL      eGFR 103 ml/min/1.73sq m     Narrative:      National Kidney Disease Foundation guidelines for Chronic Kidney Disease (CKD):     Stage 1 with normal or high GFR (GFR > 90 mL/min/1.73 square meters)    Stage 2 Mild CKD (GFR = 60-89 mL/min/1.73 square meters)    Stage 3A Moderate CKD (GFR = 45-59 mL/min/1.73 square meters)    Stage 3B Moderate CKD (GFR = 30-44 mL/min/1.73 square meters)    Stage 4 Severe CKD (GFR = 15-29 mL/min/1.73 square meters)    Stage 5 End Stage CKD (GFR <15 mL/min/1.73 square meters)  Note: GFR calculation is accurate only with a steady state creatinine    CBC and differential [[id number]] Collected: 01/07/24 1018    Lab Status: Final result Specimen: Blood from Arm, Left Updated: 01/07/24 1026     WBC 6.15 Thousand/uL      RBC 5.29 Million/uL      Hemoglobin 15.1 g/dL      Hematocrit 44.7 %      MCV 85 fL      MCH 28.5 pg      MCHC 33.8 g/dL      RDW 12.4 %      MPV 10.0 fL      Platelets 244 Thousands/uL      nRBC 0 /100 WBCs      Neutrophils Relative 60 %      Immat GRANS % 0 %      Lymphocytes Relative 30 %      Monocytes Relative 8 %      Eosinophils Relative 1 %      Basophils Relative 1 %       "Neutrophils Absolute 3.74 Thousands/µL      Immature Grans Absolute 0.01 Thousand/uL      Lymphocytes Absolute 1.82 Thousands/µL      Monocytes Absolute 0.49 Thousand/µL      Eosinophils Absolute 0.06 Thousand/µL      Basophils Absolute 0.03 Thousands/µL                    CT facial bones w contrast   Final Result by Tim Mariee DO (01/07 1223)      Nonspecific mild enlargement of the parotid gland with edema and stranding in the adjacent fat suggestive of parotitis. No evidence of sialoadenitis, mass or adenopathy.         Workstation performed: [id number]                    Procedures  Procedures         ED Course                               SBIRT 20yo+      Flowsheet Row Most Recent Value   Initial Alcohol Screen: US AUDIT-C     1. How often do you have a drink containing alcohol? 0 Filed at: 01/07/2024 0940   2. How many drinks containing alcohol do you have on a typical day you are drinking?  0 Filed at: 01/07/2024 0940   3a. Male UNDER 65: How often do you have five or more drinks on one occasion? 0 Filed at: 01/07/2024 0940   3b. FEMALE Any Age, or MALE 65+: How often do you have 4 or more drinks on one occassion? 0 Filed at: 01/07/2024 0940   Audit-C Score 0 Filed at: 01/07/2024 0940   MARKUS: How many times in the past year have you...    Used an illegal drug or used a prescription medication for non-medical reasons? Never Filed at: 01/07/2024 0940                      Medical Decision Making  Patient is a 28-year-old male with no reported past medical history, presenting to the ED for evaluation of left-sided facial swelling x 1 day.    I reviewed all of patient's labs which are unremarkable. CT showed \"nonspecific mild enlargement of the parotid gland with edema and stranding in the adjacent fat suggestive of parotitis\".  Patient's pain significantly improved after Toradol.  He was encouraged to increase fluid intake, suck on sour or lemon-flavored candy and take tylenol/motrin as needed for " pain.  He was given a prescription for Augmentin and a referral to ENT for close follow-up.    The management plan was discussed in detail with the patient at bedside and all questions were answered. Strict ED return instructions were discussed at bedside. Prior to discharge, both verbal and written instructions were provided. We discussed the signs and symptoms that should prompt the patient to return to the ED. All questions were answered and the patient was comfortable with the plan of care and discharged home. The patient agrees to return to the Emergency Department for concerns and/or progression of illness.     Amount and/or Complexity of Data Reviewed  Labs: ordered.  Radiology: ordered.    Risk  Prescription drug management.             Disposition  Final diagnoses:   Acute parotitis     Time reflects when diagnosis was documented in both MDM as applicable and the Disposition within this note       Time User Action Codes Description Comment    1/7/2024 12:28 PM Radha Tomlinson Add [K11.21] Acute parotitis           ED Disposition       ED Disposition   Discharge    Condition   Stable    Date/Time   Sun Jan 7, 2024 1227    Comment   Jean Godfrey discharge to home/self care.                   Follow-up Information       Follow up With Specialties Details Why Contact Info Additional Information    [facility] Otolaryngology Schedule an appointment as soon as possible for a visit   [address] [place] [id number] [phone] [facility], [address] [place] [address], [address]   [phone]    [facility] Emergency Department Emergency Medicine  If symptoms worsen [address] [facility] [address]  [phone] [facility] Emergency Department, [address] [place], [place], [address]            Discharge Medication List as of 1/7/2024 12:31 PM         START taking these medications    Details   amoxicillin-clavulanate (AUGMENTIN) 875-125 mg per tablet Take 1 tablet by mouth every 12 (twelve) hours for 10 days, Starting Sun 1/7/2024, Until Wed 1/17/2024, Normal                 PDMP Review       None            ED Provider  Electronically Signed by             Radha Tomlinson PA-C  01/07/24 5520

## 2024-01-12 ENCOUNTER — TELEPHONE (OUTPATIENT)
Dept: FAMILY MEDICINE CLINIC | Facility: CLINIC | Age: 29
End: 2024-01-12

## 2024-01-12 NOTE — TELEPHONE ENCOUNTER
Pt called unable to contact m regarding Pt to call office back for appt if symptoms doesn't improved, visit from ER on 0107

## 2024-02-06 ENCOUNTER — OFFICE VISIT (OUTPATIENT)
Dept: FAMILY MEDICINE CLINIC | Facility: CLINIC | Age: 29
End: 2024-02-06
Payer: COMMERCIAL

## 2024-02-06 VITALS
TEMPERATURE: 97.6 F | BODY MASS INDEX: 37.83 KG/M2 | OXYGEN SATURATION: 97 % | RESPIRATION RATE: 17 BRPM | HEART RATE: 93 BPM | WEIGHT: 249.6 LBS | SYSTOLIC BLOOD PRESSURE: 116 MMHG | DIASTOLIC BLOOD PRESSURE: 70 MMHG | HEIGHT: 68 IN

## 2024-02-06 DIAGNOSIS — M54.50 CHRONIC BILATERAL LOW BACK PAIN WITHOUT SCIATICA: ICD-10-CM

## 2024-02-06 DIAGNOSIS — G89.29 CHRONIC BILATERAL LOW BACK PAIN WITHOUT SCIATICA: ICD-10-CM

## 2024-02-06 DIAGNOSIS — G44.86 CERVICOGENIC HEADACHE: Primary | ICD-10-CM

## 2024-02-06 DIAGNOSIS — M54.2 NECK PAIN: ICD-10-CM

## 2024-02-06 PROCEDURE — 96372 THER/PROPH/DIAG INJ SC/IM: CPT | Performed by: PHYSICIAN ASSISTANT

## 2024-02-06 PROCEDURE — 99214 OFFICE O/P EST MOD 30 MIN: CPT | Performed by: PHYSICIAN ASSISTANT

## 2024-02-06 RX ORDER — CYCLOBENZAPRINE HCL 5 MG
5 TABLET ORAL
Qty: 20 TABLET | Refills: 0 | Status: SHIPPED | OUTPATIENT
Start: 2024-02-06

## 2024-02-06 RX ORDER — KETOROLAC TROMETHAMINE 30 MG/ML
30 INJECTION, SOLUTION INTRAMUSCULAR; INTRAVENOUS ONCE
Status: COMPLETED | OUTPATIENT
Start: 2024-02-06 | End: 2024-02-06

## 2024-02-06 RX ADMIN — KETOROLAC TROMETHAMINE 30 MG: 30 INJECTION, SOLUTION INTRAMUSCULAR; INTRAVENOUS at 16:35

## 2024-02-06 NOTE — PROGRESS NOTES
Name: Jean Godfrey      : 1995      MRN: [id number]  Encounter Provider: Maria E Wagoner PA-C  Encounter Date: 2024   Encounter department: [facility] PRIMARY CARE [facility]    Assessment & Plan     1. Cervicogenic headache  Comments:  constant daily started on 24 has since improved after a week, still with neck pain, toradol IM administered today, muscle relaxer at night, ER if worse    2. Neck pain  Comments:  suspect cause of headache, no stiffness, fever or chills, recommend stretching as needed, f/up prn  Orders:  -     cyclobenzaprine (FLEXERIL) 5 mg tablet; Take 1 tablet (5 mg total) by mouth daily at bedtime  -     ketorolac (TORADOL) injection 30 mg    3. Chronic bilateral low back pain without sciatica  Assessment & Plan:  Intermittent low back pain, recent worsening despite home exercises, consider back XR imaging and formal PT, start muscle relaxer as needed for now. Follow-up if no further improvement.     Orders:  -     cyclobenzaprine (FLEXERIL) 5 mg tablet; Take 1 tablet (5 mg total) by mouth daily at bedtime  -     ketorolac (TORADOL) injection 30 mg       Lab Results   Component Value Date    SODIUM 136 2024    K 3.6 2024     2024    CO2 31 2024    AGAP 4 2024    BUN 14 2024    CREATININE 1.00 2024    GLUC 93 2024    GLUF 100 (H) 2023    CALCIUM 10.0 2024    AST 21 2023    ALT 25 2023    ALKPHOS 71 2023    TP 8.0 2023    TBILI 1.57 (H) 2023    EGFR 103 2024       Marietta Pena is a 28 y.o. male who presents with concerns headache and low back pain. Back pain is similar to previous, no injury, lifting doors at work, feels like a tightness in low back. Worse with prolonged sitting or standing or walking more than 20 minutes. No weakness, saddle anesthesia, bladder or bowel changes. Headache started in back of head rated 7/10, did not take anything  "for it. Got better after a few days but was constant from 1/19/24 and felt a bit dizzy which has since resolved. Radiates down to neck. No URI symptoms. No fever or chills. Since last visit had ER visit and abx outpatient for acute parotitis.         Review of Systems   Constitutional:  Negative for chills, fever and unexpected weight change.   HENT:  Negative for sinus pressure, sinus pain and sore throat.    Eyes:  Positive for photophobia. Negative for pain, redness, itching and visual disturbance.   Respiratory:  Negative for shortness of breath.    Cardiovascular:  Negative for chest pain.   Musculoskeletal:  Positive for back pain, myalgias and neck pain. Negative for neck stiffness.   Neurological:  Positive for headaches (improving).       No current outpatient medications on file prior to visit.       Objective     /70 (BP Location: Left arm, Patient Position: Sitting, Cuff Size: Large)   Pulse 93   Temp 97.6 °F (36.4 °C) (Tympanic)   Resp 17   Ht 5' 8\" (1.727 m)   Wt 113 kg (249 lb 9.6 oz)   SpO2 97%   BMI 37.95 kg/m²     Physical Exam  Vitals and nursing note reviewed.   Constitutional:       Appearance: Normal appearance.   HENT:      Head: Normocephalic and atraumatic.   Eyes:      Extraocular Movements: Extraocular movements intact.      Pupils: Pupils are equal, round, and reactive to light.      Comments: No nystagmus  Photosensitivity +   Cardiovascular:      Rate and Rhythm: Normal rate and regular rhythm.      Pulses: Normal pulses.      Heart sounds: Normal heart sounds.   Pulmonary:      Effort: Pulmonary effort is normal.      Breath sounds: Normal breath sounds.   Musculoskeletal:         General: Tenderness present.      Cervical back: Tenderness present. No spasms or crepitus. Pain with movement present. Normal range of motion.      Thoracic back: Normal. Normal range of motion.      Lumbar back: Tenderness present. Decreased range of motion. Negative right straight leg raise " test and negative left straight leg raise test.      Comments: Negative WISAM and FADIR b/l  5/5 strength in LE b/l      Neurological:      Mental Status: He is alert and oriented to person, place, and time. Mental status is at baseline.      Sensory: Sensation is intact.      Motor: Motor function is intact.      Gait: Gait is intact. Tandem walk normal.       Maria E Wagoner PA-C

## 2024-02-07 PROBLEM — G89.29 CHRONIC BILATERAL LOW BACK PAIN WITHOUT SCIATICA: Status: ACTIVE | Noted: 2023-10-31

## 2024-02-07 NOTE — ASSESSMENT & PLAN NOTE
Intermittent low back pain, recent worsening despite home exercises, consider back XR imaging and formal PT, start muscle relaxer as needed for now. Follow-up if no further improvement.

## 2024-02-27 DIAGNOSIS — M54.2 NECK PAIN: ICD-10-CM

## 2024-02-27 DIAGNOSIS — M54.50 CHRONIC BILATERAL LOW BACK PAIN WITHOUT SCIATICA: ICD-10-CM

## 2024-02-27 DIAGNOSIS — G89.29 CHRONIC BILATERAL LOW BACK PAIN WITHOUT SCIATICA: ICD-10-CM

## 2024-02-27 RX ORDER — CYCLOBENZAPRINE HCL 5 MG
5 TABLET ORAL
Qty: 20 TABLET | Refills: 0 | Status: SHIPPED | OUTPATIENT
Start: 2024-02-27

## 2024-04-11 ENCOUNTER — APPOINTMENT (OUTPATIENT)
Dept: URGENT CARE | Facility: MEDICAL CENTER | Age: 29
End: 2024-04-11
Payer: OTHER MISCELLANEOUS

## 2024-04-11 PROCEDURE — 99283 EMERGENCY DEPT VISIT LOW MDM: CPT | Performed by: FAMILY MEDICINE

## 2024-04-11 PROCEDURE — G0382 LEV 3 HOSP TYPE B ED VISIT: HCPCS | Performed by: FAMILY MEDICINE

## 2024-04-18 ENCOUNTER — APPOINTMENT (OUTPATIENT)
Dept: URGENT CARE | Facility: MEDICAL CENTER | Age: 29
End: 2024-04-18
Payer: OTHER MISCELLANEOUS

## 2024-04-18 PROCEDURE — 99213 OFFICE O/P EST LOW 20 MIN: CPT | Performed by: FAMILY MEDICINE

## 2024-05-30 ENCOUNTER — OFFICE VISIT (OUTPATIENT)
Dept: FAMILY MEDICINE CLINIC | Facility: CLINIC | Age: 29
End: 2024-05-30
Payer: COMMERCIAL

## 2024-05-30 VITALS
TEMPERATURE: 98 F | DIASTOLIC BLOOD PRESSURE: 78 MMHG | HEIGHT: 68 IN | BODY MASS INDEX: 37.68 KG/M2 | SYSTOLIC BLOOD PRESSURE: 118 MMHG | WEIGHT: 248.6 LBS | OXYGEN SATURATION: 99 % | HEART RATE: 83 BPM | RESPIRATION RATE: 17 BRPM

## 2024-05-30 DIAGNOSIS — L40.9 PSORIASIS: ICD-10-CM

## 2024-05-30 DIAGNOSIS — L02.91 ABSCESS: Primary | ICD-10-CM

## 2024-05-30 PROCEDURE — 99214 OFFICE O/P EST MOD 30 MIN: CPT | Performed by: PHYSICIAN ASSISTANT

## 2024-05-30 PROCEDURE — 10160 PNXR ASPIR ABSC HMTMA BULLA: CPT | Performed by: PHYSICIAN ASSISTANT

## 2024-05-30 PROCEDURE — 10060 I&D ABSCESS SIMPLE/SINGLE: CPT | Performed by: PHYSICIAN ASSISTANT

## 2024-05-30 RX ORDER — CLOBETASOL PROPIONATE 0.5 MG/G
CREAM TOPICAL 2 TIMES DAILY
Qty: 60 G | Refills: 0 | Status: SHIPPED | OUTPATIENT
Start: 2024-05-30

## 2024-05-30 RX ORDER — CEPHALEXIN 500 MG/1
500 CAPSULE ORAL 2 TIMES DAILY
Qty: 14 CAPSULE | Refills: 0 | Status: SHIPPED | OUTPATIENT
Start: 2024-05-30 | End: 2024-06-06

## 2024-05-30 RX ORDER — CLOBETASOL PROPIONATE 0.46 MG/ML
SOLUTION TOPICAL 2 TIMES DAILY
Qty: 25 ML | Refills: 0 | Status: SHIPPED | OUTPATIENT
Start: 2024-05-30 | End: 2024-06-03

## 2024-05-30 NOTE — ASSESSMENT & PLAN NOTE
GW pt 41w tested POS for Covid on Sat, 11/5 and was advise to f/up regarding her appt with JESIKA, DONTE and induction date, psr spoke with nurse and informed pt that the nurse will give pt a call back once she speaks with M, pt was agreeable   Hx of scalp psoriasis and plaques as a child. With recent recurrence. Start clobetasol topically BID x 2 weeks then follow-up if no improvement.

## 2024-05-30 NOTE — PROGRESS NOTES
Ambulatory Visit  Name: eJan Godfrey      : 1995      MRN: [id number]  Encounter Provider: Maria E Wagoner PA-C  Encounter Date: 2024   Encounter department: [facility]    Assessment & Plan   1. Abscess  Comments:  x2 present on L thigh, one already draining and other one I&D completed in office today, patient tolerated procedure well, start keflex BID x 1 week f/up prn  Orders:  -     cephalexin (KEFLEX) 500 mg capsule; Take 1 capsule (500 mg total) by mouth 2 (two) times a day for 7 days  -     Incision and Drainage  2. Psoriasis  Assessment & Plan:  Hx of scalp psoriasis and plaques as a child. With recent recurrence. Start clobetasol topically BID x 2 weeks then follow-up if no improvement.   Orders:  -     clobetasol (TEMOVATE) 0.05 % cream; Apply topically 2 (two) times a day X 2 weeks then as needed  -     clobetasol (TEMOVATE) 0.05 % external solution; Apply topically 2 (two) times a day       History of Present Illness   {Disappearing Hyperlinks I Encounters * My Last Note * Since Last Visit * History :92240}  Jean is a 29 y.o. male who presents with abscess present on left thigh.  Has had 3 in the past month.  1 present on right thigh he squeezed and has healed and closed.  The other on left thigh he also squeezed and is currently draining.  Newest one lateral aspect of left thigh accumulating pustule and significantly tender to palpation.  Shaves with an electronic razor.  Has never had this problem previously.  Also has a history of psoriasis.  Has a new circular plaque present on the right anterior aspect of his ankle and along his scalp.        Review of Systems   Constitutional:  Negative for fever and unexpected weight change.   Respiratory:  Negative for shortness of breath.    Cardiovascular:  Negative for chest pain.   Skin:  Positive for wound.     Medical History Reviewed by provider this encounter:  Tobacco  Allergies  Meds   "Problems  Med Hx  Surg Hx  Fam Hx       Objective   {Disappearing Hyperlinks   Review Vitals * Enter New Vitals * Results Review * Labs * Imaging * Cardiology * Procedures * Lung Cancer Screening :59939}  /78 (BP Location: Left arm, Patient Position: Sitting, Cuff Size: Standard)   Pulse 83   Temp 98 °F (36.7 °C) (Tympanic)   Resp 17   Ht 5' 8\" (1.727 m)   Wt 113 kg (248 lb 9.6 oz)   SpO2 99%   BMI 37.80 kg/m²     Physical Exam  Vitals reviewed.   Constitutional:       Appearance: Normal appearance. He is obese.   HENT:      Head: Normocephalic and atraumatic.   Cardiovascular:      Rate and Rhythm: Normal rate and regular rhythm.   Pulmonary:      Effort: Pulmonary effort is normal.      Breath sounds: Normal breath sounds.   Skin:            Comments: X2 present L upper leg    Neurological:      Mental Status: He is alert and oriented to person, place, and time. Mental status is at baseline.             Incision and Drainage    Date/Time: 5/30/2024 3:20 PM    Performed by: Maria E Wagoner PA-C  Authorized by: Maria E Wagoner PA-C  Universal Protocol:  Consent: Verbal consent obtained. Written consent not obtained.  Risks and benefits: risks, benefits and alternatives were discussed  Consent given by: patient  Time out: Immediately prior to procedure a \"time out\" was called to verify the correct patient, procedure, equipment, support staff and site/side marked as required.  Timeout called at: 5/30/2024 5:15 PM.  Patient understanding: patient states understanding of the procedure being performed  Patient consent: the patient's understanding of the procedure matches consent given  Procedure consent: procedure consent matches procedure scheduled  Patient identity confirmed: verbally with patient    Patient location:  Clinic  Location:     Type:  Abscess    Location:  Lower extremity    Lower extremity location:  L hip  Pre-procedure details:     Skin preparation:  Betadine  Anesthesia (see MAR for exact " dosages):     Anesthesia method:  Local infiltration    Local anesthetic:  Lidocaine 2% WITH epi  Procedure details:     Complexity:  Simple    Needle aspiration: no      Incision types:  Elliptical    Aspiration type: puncture aspiration      Scalpel blade:  15    Approach:  Open    Incision depth:  Subcutaneous    Wound management:  Probed and deloculated, irrigated with saline and extensive cleaning    Drainage:  Bloody and purulent    Drainage amount:  Scant    Wound treatment:  Wound left open    Packing materials:  None  Post-procedure details:     Patient tolerance of procedure:  Tolerated well, no immediate complications  Comments:      Xylocaine (lidocaine and epinephrine 2%)  [id number]  LOT/EXP  [id number]  01/2025          Administrative Statements {Disappearing Hyperlinks I  Level of Service * [id number]}  I have spent a total time of 45 minutes on 05/30/24 In caring for this patient including Diagnostic results, Prognosis, Risks and benefits of tx options, Instructions for management, Patient and family education, Importance of tx compliance, Risk factor reductions, Impressions, Counseling / Coordination of care, Documenting in the medical record, Reviewing / ordering tests, medicine, procedures  , and Obtaining or reviewing history  .

## 2024-06-03 RX ORDER — CLOBETASOL PROPIONATE 0.46 MG/ML
SOLUTION TOPICAL 2 TIMES DAILY
Qty: 50 ML | Refills: 0 | Status: SHIPPED | OUTPATIENT
Start: 2024-06-03

## 2024-06-27 ENCOUNTER — OFFICE VISIT (OUTPATIENT)
Dept: FAMILY MEDICINE CLINIC | Facility: CLINIC | Age: 29
End: 2024-06-27
Payer: COMMERCIAL

## 2024-06-27 ENCOUNTER — TELEPHONE (OUTPATIENT)
Age: 29
End: 2024-06-27

## 2024-06-27 VITALS
DIASTOLIC BLOOD PRESSURE: 73 MMHG | OXYGEN SATURATION: 99 % | TEMPERATURE: 98.9 F | HEART RATE: 88 BPM | RESPIRATION RATE: 17 BRPM | HEIGHT: 68 IN | SYSTOLIC BLOOD PRESSURE: 133 MMHG | BODY MASS INDEX: 37.38 KG/M2 | WEIGHT: 246.6 LBS

## 2024-06-27 DIAGNOSIS — L02.91 ABSCESS: ICD-10-CM

## 2024-06-27 DIAGNOSIS — L73.9 FOLLICULITIS: Primary | ICD-10-CM

## 2024-06-27 DIAGNOSIS — L40.9 PSORIASIS: ICD-10-CM

## 2024-06-27 PROCEDURE — 99214 OFFICE O/P EST MOD 30 MIN: CPT | Performed by: PHYSICIAN ASSISTANT

## 2024-06-27 RX ORDER — DOXYCYCLINE HYCLATE 100 MG/1
100 CAPSULE ORAL EVERY 12 HOURS SCHEDULED
Qty: 14 CAPSULE | Refills: 0 | Status: SHIPPED | OUTPATIENT
Start: 2024-06-27 | End: 2024-07-04

## 2024-06-27 NOTE — PROGRESS NOTES
"Ambulatory Visit  Name: Jean Godfrey      : 1995      MRN: [id number]  Encounter Provider: Maria E Wagoner PA-C  Encounter Date: 2024   Encounter department: [facility] PRIMARY CARE [facility]    Assessment & Plan   1. Folliculitis  Comments:  suspect cause of abscess on L lower leg, start empiric doxy and maintain clean and dry  Orders:  -     doxycycline hyclate (VIBRAMYCIN) 100 mg capsule; Take 1 capsule (100 mg total) by mouth every 12 (twelve) hours for 7 days  2. Abscess  Comments:  x3 days, not amenable to I&D, will treat empirically with doxy x 7 days, caution with risk of photosensitivity, follow-up if no improvement  3. Psoriasis  Assessment & Plan:  Some improvement with clobetasol as needed. Caution with risk of steroid overuse.        History of Present Illness   {Disappearing Hyperlinks I Encounters * My Last Note * Since Last Visit * History :90270}  Jean is a 29 y.o. male who presents with abscess present on left lower leg as a same day appointment.  Has had 4 in the past 2 months. Previous I&D site healed.  Shaves with an electronic razor.  Showers 3x a day. Playing baseball. No bug bites. L lower leg painful, hurts to walk, feels pressure.     Abscess  Pertinent negatives include no chest pain or fever.       Review of Systems   Constitutional:  Negative for fever and unexpected weight change.   Respiratory:  Negative for shortness of breath.    Cardiovascular:  Negative for chest pain.   Skin:         Abscess L lower leg        Objective   {Disappearing Hyperlinks   Review Vitals * Enter New Vitals * Results Review * Labs * Imaging * Cardiology * Procedures * Lung Cancer Screening :16274}  /73 (BP Location: Left arm, Patient Position: Sitting, Cuff Size: Standard)   Pulse 88   Temp 98.9 °F (37.2 °C) (Tympanic)   Resp 17   Ht 5' 8\" (1.727 m)   Wt 112 kg (246 lb 9.6 oz)   SpO2 99%   BMI 37.50 kg/m²     Physical Exam  Vitals reviewed. "   Constitutional:       Appearance: Normal appearance.   HENT:      Head: Normocephalic and atraumatic.   Cardiovascular:      Rate and Rhythm: Normal rate and regular rhythm.   Pulmonary:      Effort: Pulmonary effort is normal.      Breath sounds: Normal breath sounds.   Skin:     Findings: Abscess present.          Neurological:      Mental Status: He is alert and oriented to person, place, and time. Mental status is at baseline.       Administrative Statements {Disappearing Hyperlinks I  [id number]}

## 2024-06-27 NOTE — LETTER
June 27, 2024     Patient: Jean Godfrey  YOB: 1995  Date of Visit: 6/27/2024      To Whom it May Concern:    Jean Godfrey is under my professional care. Jean was seen in my office on 6/27/2024. Jean may return to work on Saturday, 6/29/24 without restrictions  .    If you have any questions or concerns, please don't hesitate to call.         Sincerely,          Maria E Wagoner PA-C        CC: No Recipients

## 2024-10-07 ENCOUNTER — OFFICE VISIT (OUTPATIENT)
Dept: FAMILY MEDICINE CLINIC | Facility: CLINIC | Age: 29
End: 2024-10-07
Payer: COMMERCIAL

## 2024-10-07 VITALS
RESPIRATION RATE: 17 BRPM | BODY MASS INDEX: 37.59 KG/M2 | TEMPERATURE: 97.5 F | SYSTOLIC BLOOD PRESSURE: 124 MMHG | HEART RATE: 78 BPM | DIASTOLIC BLOOD PRESSURE: 60 MMHG | OXYGEN SATURATION: 100 % | WEIGHT: 248 LBS | HEIGHT: 68 IN

## 2024-10-07 DIAGNOSIS — Z00.00 ANNUAL PHYSICAL EXAM: Primary | ICD-10-CM

## 2024-10-07 DIAGNOSIS — Z13.6 SCREENING FOR CARDIOVASCULAR CONDITION: ICD-10-CM

## 2024-10-07 DIAGNOSIS — L40.9 PSORIASIS: ICD-10-CM

## 2024-10-07 DIAGNOSIS — E66.812 CLASS 2 OBESITY DUE TO EXCESS CALORIES WITHOUT SERIOUS COMORBIDITY WITH BODY MASS INDEX (BMI) OF 37.0 TO 37.9 IN ADULT: ICD-10-CM

## 2024-10-07 DIAGNOSIS — M54.2 NECK PAIN: ICD-10-CM

## 2024-10-07 DIAGNOSIS — E66.09 CLASS 2 OBESITY DUE TO EXCESS CALORIES WITHOUT SERIOUS COMORBIDITY WITH BODY MASS INDEX (BMI) OF 37.0 TO 37.9 IN ADULT: ICD-10-CM

## 2024-10-07 PROCEDURE — 99213 OFFICE O/P EST LOW 20 MIN: CPT | Performed by: PHYSICIAN ASSISTANT

## 2024-10-07 PROCEDURE — 99395 PREV VISIT EST AGE 18-39: CPT | Performed by: PHYSICIAN ASSISTANT

## 2024-10-07 RX ORDER — CYCLOBENZAPRINE HCL 5 MG
5 TABLET ORAL
Qty: 20 TABLET | Refills: 0 | Status: SHIPPED | OUTPATIENT
Start: 2024-10-07

## 2024-10-07 RX ORDER — CLOBETASOL PROPIONATE 0.5 MG/G
CREAM TOPICAL 2 TIMES DAILY
Qty: 60 G | Refills: 0 | Status: SHIPPED | OUTPATIENT
Start: 2024-10-07

## 2024-10-07 NOTE — ASSESSMENT & PLAN NOTE
Primarily along anterior scalp with single approximately 5 cm well-demarcated patch with scaling of skin.  Continue clobetasol topically as needed.  Orders:    clobetasol (TEMOVATE) 0.05 % cream; Apply topically 2 (two) times a day X 2 weeks then as needed

## 2024-10-07 NOTE — PROGRESS NOTES
Adult Annual Physical  Name: Jean Godfrey      : 1995      MRN: [id number]  Encounter Provider: Maria E Wagoner PA-C  Encounter Date: 10/7/2024   Encounter department: [facility] CARE [facility]    Assessment & Plan  Annual physical exam         Psoriasis  Primarily along anterior scalp with single approximately 5 cm well-demarcated patch with scaling of skin.  Continue clobetasol topically as needed.  Orders:    clobetasol (TEMOVATE) 0.05 % cream; Apply topically 2 (two) times a day X 2 weeks then as needed    Neck pain  Playing baseball regularly and had an episode of neck pain x 1 month ago that started with batting. Pain has been intermittently bothering him since then.  Applying heat and stretching.  Continue heat as tolerated and start cyclobenzaprine 5 mg as needed at night.  Encourage continued stretching exercises.  Orders:    cyclobenzaprine (FLEXERIL) 5 mg tablet; Take 1 tablet (5 mg total) by mouth daily at bedtime as needed for muscle spasms (neck pain)    Class 2 obesity due to excess calories without serious comorbidity with body mass index (BMI) of 37.0 to 37.9 in adult  BMI Counseling: Body mass index is 37.71 kg/m². The BMI is above normal. Nutrition recommendations include reducing portion sizes, decreasing overall calorie intake, 3-5 servings of fruits/vegetables daily, reducing fast food intake, consuming healthier snacks, decreasing soda and/or juice intake, moderation in carbohydrate intake, increasing intake of lean protein, reducing intake of saturated fat and trans fat, and reducing intake of cholesterol. Exercise recommendations include exercising 3-5 times per week and strength training exercises.         Screening for cardiovascular condition    Orders:    Comprehensive metabolic panel; Future    Lipid Panel with Direct LDL reflex; Future    CBC and differential; Future    Immunizations and preventive care screenings were discussed with patient  "today. Appropriate education was printed on patient's after visit summary.    Counseling:  Alcohol/drug use: discussed moderation in alcohol intake, the recommendations for healthy alcohol use, and avoidance of illicit drug use.  Dental Health: discussed importance of regular tooth brushing, flossing, and dental visits.  Injury prevention: discussed safety/seat belts, safety helmets, smoke detectors, carbon monoxide detectors, and smoking near bedding or upholstery.  Sexual health: discussed sexually transmitted diseases, partner selection, use of condoms, avoidance of unintended pregnancy, and contraceptive alternatives.  Exercise: the importance of regular exercise/physical activity was discussed. Recommend exercise 3-5 times per week for at least 30 minutes.          History of Present Illness     Adult Annual Physical:  Patient presents for annual physical. Jean is a 29 y.o. male with a h/o psoriasis who presents for routine annual physical with complaints of neck pain x 1 month. Intermittent pain worse with batting during baseball. Does improve with heat topically. Has not taken any medications for it.    Leila SIDDIQUI-Student present for history and physical for observation only.   Wife present for exam. .     Diet and Physical Activity:  - Diet/Nutrition: poor diet.  - Exercise: 1-2 times a week on average.    Depression Screening:  - PHQ-2 Score: 0    General Health:  - Sleep: sleeps well. 6-7 hours  - Hearing: normal hearing bilateral ears.  - Vision: no vision problems.  - Dental: regular dental visits.     Health:  - History of STDs: no.   - Urinary symptoms: none.     Advanced Care Planning:  - Has an advanced directive?: no    - Has a durable medical POA?: no    - ACP document given to patient?: no      Review of Systems      Objective     /60 (BP Location: Left arm, Patient Position: Sitting, Cuff Size: Standard)   Pulse 78   Temp 97.5 °F (36.4 °C) (Temporal)   Resp 17   Ht 5' 8\" (1.727 m)   " Wt 112 kg (248 lb)   SpO2 100%   BMI 37.71 kg/m²     Physical Exam  Vitals and nursing note reviewed.   Constitutional:       General: He is not in acute distress.     Appearance: He is well-developed.   HENT:      Head: Normocephalic and atraumatic.        Right Ear: Tympanic membrane, ear canal and external ear normal.      Left Ear: Tympanic membrane, ear canal and external ear normal.   Eyes:      Conjunctiva/sclera: Conjunctivae normal.   Neck:      Comments: Mild bilateral paraspinal neck tenderness posteriorly  Cardiovascular:      Rate and Rhythm: Normal rate and regular rhythm.      Heart sounds: No murmur heard.  Pulmonary:      Effort: Pulmonary effort is normal. No respiratory distress.      Breath sounds: Normal breath sounds.   Abdominal:      Palpations: Abdomen is soft.      Tenderness: There is no abdominal tenderness.   Musculoskeletal:         General: No swelling.      Cervical back: Neck supple. No rigidity, tenderness or crepitus. Muscular tenderness present. No spinous process tenderness. Normal range of motion.   Skin:     General: Skin is warm and dry.      Capillary Refill: Capillary refill takes less than 2 seconds.   Neurological:      Mental Status: He is alert.   Psychiatric:         Mood and Affect: Mood normal.

## 2024-10-08 PROBLEM — E66.812 CLASS 2 OBESITY DUE TO EXCESS CALORIES WITHOUT SERIOUS COMORBIDITY WITH BODY MASS INDEX (BMI) OF 37.0 TO 37.9 IN ADULT: Status: ACTIVE | Noted: 2023-09-12

## 2024-10-08 PROBLEM — E66.09 CLASS 2 OBESITY DUE TO EXCESS CALORIES WITHOUT SERIOUS COMORBIDITY WITH BODY MASS INDEX (BMI) OF 37.0 TO 37.9 IN ADULT: Status: ACTIVE | Noted: 2023-09-12

## 2024-10-08 NOTE — ASSESSMENT & PLAN NOTE
BMI Counseling: Body mass index is 37.71 kg/m². The BMI is above normal. Nutrition recommendations include reducing portion sizes, decreasing overall calorie intake, 3-5 servings of fruits/vegetables daily, reducing fast food intake, consuming healthier snacks, decreasing soda and/or juice intake, moderation in carbohydrate intake, increasing intake of lean protein, reducing intake of saturated fat and trans fat, and reducing intake of cholesterol. Exercise recommendations include exercising 3-5 times per week and strength training exercises.

## 2025-01-02 DIAGNOSIS — L40.9 PSORIASIS: ICD-10-CM

## 2025-01-03 RX ORDER — CLOBETASOL PROPIONATE 0.5 MG/G
CREAM TOPICAL 2 TIMES DAILY
Qty: 60 G | Refills: 5 | Status: SHIPPED | OUTPATIENT
Start: 2025-01-03

## 2025-02-19 ENCOUNTER — OFFICE VISIT (OUTPATIENT)
Dept: FAMILY MEDICINE CLINIC | Facility: CLINIC | Age: 30
End: 2025-02-19
Payer: COMMERCIAL

## 2025-02-19 VITALS
DIASTOLIC BLOOD PRESSURE: 82 MMHG | OXYGEN SATURATION: 98 % | BODY MASS INDEX: 38.19 KG/M2 | HEART RATE: 88 BPM | RESPIRATION RATE: 17 BRPM | WEIGHT: 252 LBS | HEIGHT: 68 IN | TEMPERATURE: 97.7 F | SYSTOLIC BLOOD PRESSURE: 130 MMHG

## 2025-02-19 DIAGNOSIS — R05.1 ACUTE COUGH: ICD-10-CM

## 2025-02-19 DIAGNOSIS — R09.81 NASAL CONGESTION: ICD-10-CM

## 2025-02-19 DIAGNOSIS — L40.9 PSORIASIS: ICD-10-CM

## 2025-02-19 DIAGNOSIS — J02.9 SORE THROAT: Primary | ICD-10-CM

## 2025-02-19 DIAGNOSIS — L21.0 DANDRUFF: ICD-10-CM

## 2025-02-19 LAB — S PYO AG THROAT QL: NEGATIVE

## 2025-02-19 PROCEDURE — 99214 OFFICE O/P EST MOD 30 MIN: CPT | Performed by: PHYSICIAN ASSISTANT

## 2025-02-19 PROCEDURE — 87880 STREP A ASSAY W/OPTIC: CPT | Performed by: PHYSICIAN ASSISTANT

## 2025-02-19 RX ORDER — MOMETASONE FUROATE 1 MG/ML
SOLUTION TOPICAL DAILY
Qty: 60 ML | Refills: 0 | Status: SHIPPED | OUTPATIENT
Start: 2025-02-19

## 2025-02-19 RX ORDER — DEXTROMETHORPHAN HYDROBROMIDE AND PROMETHAZINE HYDROCHLORIDE 15; 6.25 MG/5ML; MG/5ML
5 SYRUP ORAL 4 TIMES DAILY PRN
Qty: 240 ML | Refills: 0 | Status: SHIPPED | OUTPATIENT
Start: 2025-02-19

## 2025-02-19 RX ORDER — FLUTICASONE PROPIONATE 50 MCG
1 SPRAY, SUSPENSION (ML) NASAL DAILY
Qty: 9.9 ML | Refills: 0 | Status: SHIPPED | OUTPATIENT
Start: 2025-02-19

## 2025-02-19 RX ORDER — KETOCONAZOLE 20 MG/ML
1 SHAMPOO, SUSPENSION TOPICAL 2 TIMES WEEKLY
Qty: 120 ML | Refills: 0 | Status: SHIPPED | OUTPATIENT
Start: 2025-02-20

## 2025-02-19 NOTE — PROGRESS NOTES
Name: Jean Godfrey      : 1995      MRN: [id number]  Encounter Provider: Maria E Wagoner PA-C  Encounter Date: 2025   Encounter department: [facility] PRIMARY CARE [facility]  :  Assessment & Plan  Sore throat  Suspect viral, negative rapid strep in office today.   Orders:  •  POCT rapid ANTIGEN strepA    Acute cough  Suspect viral, start Prometh/DM as needed.  Recommend rest, hydration.  Follow-up if no improvement.  Orders:  •  promethazine-dextromethorphan (PHENERGAN-DM) 6.25-15 mg/5 mL oral syrup; Take 5 mL by mouth 4 (four) times a day as needed for cough    Nasal congestion  Start Flonase as needed.  Orders:  •  fluticasone (FLONASE) 50 mcg/act nasal spray; 1 spray into each nostril daily    Psoriasis  Primarily along anterior scalp, no current flare, start mometasone lotion as needed for flares.  Avoid excessive overuse of steroid cream.  Referred to dermatology today.  Orders:  •  Ambulatory Referral to Dermatology; Future  •  mometasone (ELOCON) 0.1 % lotion; Apply topically daily As needed for flares    Dandruff  Start ketoconazole shampoo, follow-up if no improvement.  Orders:  •  ketoconazole (NIZORAL) 2 % shampoo; Apply 1 Application topically 2 (two) times a week Leave in for 5 minutes with wet hair. Then rinse.           History of Present Illness   Jean is a 29 y.o. male who presents for nasal congestion, sore throat x 3 days.  No fever or chills.  Has not taking anything.  Drinks coffee, does not like tea.  No known sick exposures.  No current flare of psoriasis but every time he stops using cream it comes back.  No smoking.  Stressed about wife, she has been having pelvic pain and took her to [place] and multiple doctors who told her everything was normal, was just in the ER with her last week.  Wants to have kids but has not been able to conceive.    Wife, Cristina, present for exam and helped for a portion of history in [place]      Review of Systems  "  Constitutional:  Negative for chills, fever and unexpected weight change.   HENT:  Positive for congestion, postnasal drip and sore throat. Negative for trouble swallowing and voice change.    Respiratory:  Positive for cough (Dry). Negative for shortness of breath.    Cardiovascular:  Negative for chest pain.   Skin:  Positive for rash (Dry, scaly, scalp).       Objective   /82 (BP Location: Left arm, Patient Position: Sitting, Cuff Size: Extra-Large)   Pulse 88   Temp 97.7 °F (36.5 °C) (Tympanic)   Resp 17   Ht 5' 8\" (1.727 m)   Wt 114 kg (252 lb)   SpO2 98%   BMI 38.32 kg/m²      Physical Exam  Vitals reviewed.   Constitutional:       Appearance: Normal appearance. He is obese.   HENT:      Head: Normocephalic and atraumatic.      Right Ear: Tympanic membrane and ear canal normal.      Left Ear: Tympanic membrane and ear canal normal.      Nose: Congestion present.      Mouth/Throat:      Mouth: Mucous membranes are moist.      Tonsils: No tonsillar exudate or tonsillar abscesses. 1+ on the right. 1+ on the left.   Cardiovascular:      Rate and Rhythm: Normal rate and regular rhythm.   Pulmonary:      Effort: Pulmonary effort is normal.      Breath sounds: Normal breath sounds.   Neurological:      Mental Status: He is alert and oriented to person, place, and time. Mental status is at baseline.         "

## 2025-02-19 NOTE — ASSESSMENT & PLAN NOTE
Primarily along anterior scalp, no current flare, start mometasone lotion as needed for flares.  Avoid excessive overuse of steroid cream.  Referred to dermatology today.  Orders:  •  Ambulatory Referral to Dermatology; Future  •  mometasone (ELOCON) 0.1 % lotion; Apply topically daily As needed for flares

## 2025-03-18 DIAGNOSIS — L21.0 DANDRUFF: ICD-10-CM

## 2025-03-18 RX ORDER — KETOCONAZOLE 20 MG/ML
1 SHAMPOO, SUSPENSION TOPICAL 2 TIMES WEEKLY
Qty: 120 ML | Refills: 0 | Status: SHIPPED | OUTPATIENT
Start: 2025-03-20

## 2025-06-27 DIAGNOSIS — L40.9 PSORIASIS: ICD-10-CM

## 2025-06-30 RX ORDER — MOMETASONE FUROATE 1 MG/ML
SOLUTION TOPICAL DAILY
Qty: 60 ML | Refills: 0 | OUTPATIENT
Start: 2025-06-30